# Patient Record
Sex: FEMALE | Race: WHITE
[De-identification: names, ages, dates, MRNs, and addresses within clinical notes are randomized per-mention and may not be internally consistent; named-entity substitution may affect disease eponyms.]

---

## 2019-01-25 ENCOUNTER — RECORD ABSTRACTING (OUTPATIENT)
Age: 74
End: 2019-01-25

## 2019-01-31 ENCOUNTER — APPOINTMENT (OUTPATIENT)
Dept: CARDIOLOGY | Facility: CLINIC | Age: 74
End: 2019-01-31
Payer: MEDICARE

## 2019-01-31 VITALS
DIASTOLIC BLOOD PRESSURE: 68 MMHG | WEIGHT: 148 LBS | OXYGEN SATURATION: 100 % | HEART RATE: 62 BPM | BODY MASS INDEX: 25.27 KG/M2 | SYSTOLIC BLOOD PRESSURE: 124 MMHG | HEIGHT: 64 IN

## 2019-01-31 DIAGNOSIS — Z87.39 PERSONAL HISTORY OF OTHER DISEASES OF THE MUSCULOSKELETAL SYSTEM AND CONNECTIVE TISSUE: ICD-10-CM

## 2019-01-31 DIAGNOSIS — Z78.9 OTHER SPECIFIED HEALTH STATUS: ICD-10-CM

## 2019-01-31 DIAGNOSIS — Z87.891 PERSONAL HISTORY OF NICOTINE DEPENDENCE: ICD-10-CM

## 2019-01-31 PROCEDURE — 99214 OFFICE O/P EST MOD 30 MIN: CPT

## 2019-01-31 RX ORDER — ASPIRIN 81 MG
81 TABLET, DELAYED RELEASE (ENTERIC COATED) ORAL DAILY
Refills: 0 | Status: ACTIVE | COMMUNITY

## 2019-01-31 RX ORDER — LEFLUNOMIDE 20 MG/1
20 TABLET, FILM COATED ORAL DAILY
Refills: 0 | Status: ACTIVE | COMMUNITY

## 2019-01-31 NOTE — ASSESSMENT
[FreeTextEntry1] : Reviewed on January 31, 2019\par Labs, EKG, chest x-ray, CT of the chest from [facility] were reviewed.

## 2019-01-31 NOTE — REVIEW OF SYSTEMS
[see HPI] : see HPI [Shortness Of Breath] : shortness of breath [Chest Pain] : chest pain [Palpitations] : palpitations [Negative] : Heme/Lymph

## 2019-01-31 NOTE — PHYSICAL EXAM
[General Appearance - Well Developed] : well developed [Normal Appearance] : normal appearance [Well Groomed] : well groomed [General Appearance - Well Nourished] : well nourished [No Deformities] : no deformities [General Appearance - In No Acute Distress] : no acute distress [No Oral Pallor] : no oral pallor [Normal Jugular Venous A Waves Present] : normal jugular venous A waves present [Normal Jugular Venous V Waves Present] : normal jugular venous V waves present [No Jugular Venous Langston A Waves] : no jugular venous langston A waves [Respiration, Rhythm And Depth] : normal respiratory rhythm and effort [Exaggerated Use Of Accessory Muscles For Inspiration] : no accessory muscle use [Auscultation Breath Sounds / Voice Sounds] : lungs were clear to auscultation bilaterally [Heart Rate And Rhythm] : heart rate and rhythm were normal [Heart Sounds] : normal S1 and S2 [Arterial Pulses Normal] : the arterial pulses were normal [Edema] : no peripheral edema present [Veins - Varicosity Changes] : no varicosital changes were noted in the lower extremities [Abdomen Soft] : soft [Abnormal Walk] : normal gait [Gait - Sufficient For Exercise Testing] : the gait was sufficient for exercise testing [Nail Clubbing] : no clubbing of the fingernails [Cyanosis, Localized] : no localized cyanosis [Petechial Hemorrhages (___cm)] : no petechial hemorrhages [Skin Color & Pigmentation] : normal skin color and pigmentation [] : no rash [No Venous Stasis] : no venous stasis [Skin Lesions] : no skin lesions [No Skin Ulcers] : no skin ulcer [No Xanthoma] : no  xanthoma was observed [Oriented To Time, Place, And Person] : oriented to person, place, and time [Affect] : the affect was normal [Mood] : the mood was normal [No Anxiety] : not feeling anxious [FreeTextEntry1] : Systolic ejection murmur 1-2/6, no bruit, no gallop, rub, or heave

## 2019-01-31 NOTE — DISCUSSION/SUMMARY
[FreeTextEntry1] : 72-year-old female with atypical chest pain in presence of multiple cardiovascular risk factors, which includes her postmenopausal age, hypertension, hyperlipidemia, former history of smoking.\par She is unable to do aggressive exercises.\par She also has exogenous hyperthyroidism at present in presence of underlying Graves' disease, which is being managed with medication changes.\par At present. I have recommended heard.\par Echocardiogram for ejection fraction wall motion pericardial space evaluation.\par Once thyroid is stable. I would recommend her to have stress and if needed pharmacological means considering low level of exercise tolerance and underlying Graves' disease myocardial perfusion scan to assess evaluate and rule out any significant ischemic heart disease as etiology for her symptoms.\par \par Essential hypertension. Well controlled. Continue present medications, which includes angiotensin receptor blocker, beta blocker, and calcium channel blocker, specifically with significantly noted symptoms.\par \par Hyperlipidemia. Continue statin therapy.\par \par Counseling regarding low saturated fat, salt and carbohydrate intake was reviewed. Active lifestyle and regular. Exercise along with weight management is advised.\par All the above were at length reviewed. Answered all the questions. Thank you very much for this kind referral. Please do not hesitate to give me a call for any question.\par Part of this transcription was done with voice recognition software and phonetically similar errors are common. I apologize for that. Please donot hesitate to call for any questions due to above.\par \par Followup S. plan\par

## 2019-01-31 NOTE — REASON FOR VISIT
[Follow-Up - From Hospitalization] : follow-up of a recent hospitalization for [Chest Pain] : chest pain [Hyperlipidemia] : hyperlipidemia [Hypertension] : hypertension [Palpitations] : palpitations [Spouse] : spouse

## 2019-01-31 NOTE — HISTORY OF PRESENT ILLNESS
[FreeTextEntry1] : 73-year-old comes in for followup consultation after a recent hospital admission.\par Her medical history mainly significant for\par Graves' disease with recent changes in medications.\par Essential hypertension without any history of congestive heart failure, renal insufficiency. She is a nonsmoker.\par Hyperlipidemia. On statin therapy.\par Rheumatoid arthritis with gray nodes syndrome.\par Gastroesophageal reflux disease.\par Recently admitted with chest pain. Evaluation when she was in the hospital included EKG, chest x-ray, CT of the chest, and labs. She was ruled out for myocardial infarction. She did not have any pulmonary embolism or significant pulmonary disease. Carvedilol was added to the medication regimen. Since then. She has done fairly well. Without any recurrence of symptoms. Her thyroid medication has been adjusted, and she was noted to be hyperthyroid.\par She is stable dyspnea on exertion without PND, orthopnea, or pedal edema.\par She has no significant worsening of palpitation and no syncopal episode.\par She has no claudication pain

## 2019-03-12 ENCOUNTER — APPOINTMENT (OUTPATIENT)
Dept: CARDIOLOGY | Facility: CLINIC | Age: 74
End: 2019-03-12

## 2019-04-03 ENCOUNTER — APPOINTMENT (OUTPATIENT)
Dept: CARDIOLOGY | Facility: CLINIC | Age: 74
End: 2019-04-03
Payer: MEDICARE

## 2019-04-03 VITALS
OXYGEN SATURATION: 98 % | HEIGHT: 64 IN | DIASTOLIC BLOOD PRESSURE: 60 MMHG | HEART RATE: 74 BPM | SYSTOLIC BLOOD PRESSURE: 144 MMHG | WEIGHT: 148 LBS | BODY MASS INDEX: 25.27 KG/M2

## 2019-04-03 PROCEDURE — 99214 OFFICE O/P EST MOD 30 MIN: CPT

## 2019-04-03 PROCEDURE — 93306 TTE W/DOPPLER COMPLETE: CPT

## 2019-04-03 NOTE — REVIEW OF SYSTEMS
[see HPI] : see HPI [Shortness Of Breath] : shortness of breath [Chest Pain] : chest pain [Lower Ext Edema] : lower extremity edema [Palpitations] : palpitations [Negative] : Heme/Lymph

## 2019-04-03 NOTE — DISCUSSION/SUMMARY
[FreeTextEntry1] : 74-year-old female with above medical history and active medical problems as noted.\par #1 atypical chest pain. No recent recurrence. Though multiple cardiovascular risk factors.\par At present. I have recommended her once she is euthyroid. We will do pharmacological myocardial perfusion scan as limited functional status.\par #2 persistent small left pleural effusion. Her mild borderline pulmonary hypertension on echocardiogram. Negative CTA except left pleural effusion. Cough and recent flulike symptoms. Recommended to see a pulmonologist.\par #3 Graves' disease. Recent adjustment of her thyroid medication. Over 18, TSH, and other titer profile to make sure she is euthyroid state.\par #4 mild aortic insufficiency and presence of preserved ejection fraction and dimensions.\par #5 essential hypertension. Mildly uncontrolled. Increase carvedilol to 6.25 mg twice daily. If worsening symptoms of raynaud's or dizziness, or other symptoms. She will decrease it back to 3.125 mg and contact us.\par Amlodipine could be due to reason for her bilateral mild ankle edema. At present we have not changed. It considering it is significantly helping her with raynaud's.\par Continue with losartan.\par Low salt diet.\par #6 hyperlipidemia. Continue statin therapy.\par \par Counseling regarding low saturated fat, salt and carbohydrate intake was reviewed. Active lifestyle and regular. Exercise along with weight management is advised.\par All the above were at length reviewed. Answered all the questions. Thank you very much for this kind referral. Please do not hesitate to give me a call for any question.\par Part of this transcription was done with voice recognition software and phonetically similar errors are common. I apologize for that. Please donot hesitate to call for any questions due to above.\par \par Followup as planned

## 2019-04-03 NOTE — ASSESSMENT
[FreeTextEntry1] : Reviewed on January 31, 2019\par Labs, EKG, chest x-ray, CT of the chest from [facility] were reviewed.\par \par Reviewed on April 3, 2019\par Echocardiogram. Improved her test is 19. Reviewed by me. Ejection fraction 65% mild aortic insufficiency. Mild mitral regurgitation. No pericardial effusion. Pulmonary pressure around 40 mm mercury suggesting mild pulmonary hypertension.\par Chest x-ray are obtained by her recently for her pulmonary symptoms had shown persistent small left pleural effusion, otherwise no significant cardiovascular abnormality.\par CT of the chest in January 2019 at [facility] had shown also small left pleural effusion. No pericardial effusion.

## 2019-04-03 NOTE — PHYSICAL EXAM
[General Appearance - Well Developed] : well developed [Normal Appearance] : normal appearance [Well Groomed] : well groomed [General Appearance - Well Nourished] : well nourished [No Deformities] : no deformities [General Appearance - In No Acute Distress] : no acute distress [No Oral Pallor] : no oral pallor [Normal Jugular Venous A Waves Present] : normal jugular venous A waves present [Normal Jugular Venous V Waves Present] : normal jugular venous V waves present [No Jugular Venous Langston A Waves] : no jugular venous langston A waves [Respiration, Rhythm And Depth] : normal respiratory rhythm and effort [Exaggerated Use Of Accessory Muscles For Inspiration] : no accessory muscle use [Auscultation Breath Sounds / Voice Sounds] : lungs were clear to auscultation bilaterally [Heart Rate And Rhythm] : heart rate and rhythm were normal [Heart Sounds] : normal S1 and S2 [Arterial Pulses Normal] : the arterial pulses were normal [Veins - Varicosity Changes] : no varicosital changes were noted in the lower extremities [FreeTextEntry1] : Systolic ejection murmur 1-2/6, no bruit, no gallop, rub, or heave, 1+ bilateral ankle edema [Abdomen Soft] : soft [Abnormal Walk] : normal gait [Gait - Sufficient For Exercise Testing] : the gait was sufficient for exercise testing [Nail Clubbing] : no clubbing of the fingernails [Cyanosis, Localized] : no localized cyanosis [Petechial Hemorrhages (___cm)] : no petechial hemorrhages [Skin Color & Pigmentation] : normal skin color and pigmentation [] : no rash [No Venous Stasis] : no venous stasis [Skin Lesions] : no skin lesions [No Skin Ulcers] : no skin ulcer [No Xanthoma] : no  xanthoma was observed [Oriented To Time, Place, And Person] : oriented to person, place, and time [Affect] : the affect was normal [Mood] : the mood was normal [No Anxiety] : not feeling anxious

## 2019-04-03 NOTE — HISTORY OF PRESENT ILLNESS
[FreeTextEntry1] : \par Her medical history mainly significant for\par Graves' disease with recent changes in medications.\par Essential hypertension without any history of congestive heart failure, renal insufficiency. She is a nonsmoker.\par Hyperlipidemia. On statin therapy.\par Rheumatoid arthritis with gray nodes syndrome.\par Gastroesophageal reflux disease.\par Recently admitted with chest pain. Evaluation when she was in the hospital included EKG, chest x-ray, CT of the chest, and labs. She was ruled out for myocardial infarction. She did not have any pulmonary embolism or significant pulmonary disease. Carvedilol was added to the medication regimen. Since then. She has done fairly well. Without any recurrence of symptoms. Her thyroid medication has been adjusted, and she was noted to be hyperthyroid.\par

## 2019-04-03 NOTE — REASON FOR VISIT
[Follow-Up - Clinic] : a clinic follow-up of [Chest Pain] : chest pain [Hyperlipidemia] : hyperlipidemia [Hypertension] : hypertension [Palpitations] : palpitations [FreeTextEntry1] : 74-year-old comes in for followup consultation two-view echocardiogram. Also, she had a chest x-ray because of her recent flulike syndrome.\par She has no significant chest pain at present. No PND, orthopnea.\par She has no significant palpitation, dizziness, syncopal episode.\par She does have dry cough, which is improving without PND, orthopnea.\par She has mild chronic edema.\par She denies any hemoptysis.\par She has not had a thyroid function checked [Spouse] : spouse

## 2019-04-24 ENCOUNTER — APPOINTMENT (OUTPATIENT)
Dept: CARDIOLOGY | Facility: CLINIC | Age: 74
End: 2019-04-24
Payer: MEDICARE

## 2019-04-24 ENCOUNTER — NON-APPOINTMENT (OUTPATIENT)
Age: 74
End: 2019-04-24

## 2019-04-24 VITALS
HEIGHT: 64 IN | DIASTOLIC BLOOD PRESSURE: 76 MMHG | HEART RATE: 65 BPM | SYSTOLIC BLOOD PRESSURE: 132 MMHG | BODY MASS INDEX: 25.61 KG/M2 | WEIGHT: 150 LBS

## 2019-04-24 PROCEDURE — 93000 ELECTROCARDIOGRAM COMPLETE: CPT

## 2019-04-24 PROCEDURE — 99214 OFFICE O/P EST MOD 30 MIN: CPT

## 2019-04-24 NOTE — ASSESSMENT
[FreeTextEntry1] : Reviewed today:  EKG, NSR. \par \par Reviewed on January 31, 2019\par Labs, EKG, chest x-ray, CT of the chest from [facility] were reviewed.\par \par Reviewed on April 3, 2019\par Echocardiogram. Improved her test is 19. Reviewed by me. Ejection fraction 65% mild aortic insufficiency. Mild mitral regurgitation. No pericardial effusion. Pulmonary pressure around 40 mm mercury suggesting mild pulmonary hypertension.\par Chest x-ray are obtained by her recently for her pulmonary symptoms had shown persistent small left pleural effusion, otherwise no significant cardiovascular abnormality.\par CT of the chest in January 2019 at [facility] had shown also small left pleural effusion. No pericardial effusion.

## 2019-04-24 NOTE — REVIEW OF SYSTEMS
[Shortness Of Breath] : shortness of breath [see HPI] : see HPI [Chest Pain] : chest pain [Lower Ext Edema] : lower extremity edema [Palpitations] : palpitations [Negative] : Heme/Lymph

## 2019-04-24 NOTE — DISCUSSION/SUMMARY
[FreeTextEntry1] : 74-year-old female with above medical history and active medical problems as noted.\par #1 atypical chest pain. No recent recurrence. Though multiple cardiovascular risk factors.\par At present. I have recommended her once she is euthyroid. We will do pharmacological myocardial perfusion scan as limited functional status.\par #2 persistent small left pleural effusion. Her mild borderline pulmonary hypertension on echocardiogram. Negative CTA except left pleural effusion. Cough and recent flulike symptoms. Recommended to see a pulmonologist.\par #3 Graves' disease. Recent adjustment of her thyroid medication. Over 18, TSH, and other titer profile to make sure she is euthyroid state.\par #4 mild aortic insufficiency and presence of preserved ejection fraction and dimensions.\par #5 essential hypertension. Mildly uncontrolled.  Continue carvedilol to 6.25 mg twice daily.\par Amlodipine could be due to reason for her bilateral mild ankle edema. At present we have not changed. It considering it is significantly helping her with raynaud's.  Recent dietary indiscretion.  Start furosemide 20mg QD until edema resolved.  Weight based dosing after that.  Utilize with 2-3lb increase in 24 hours or return of edema. Labs ordered.  My require K+ supplementation based on lab work.  Increase K rich foods. \par Continue with losartan.\par Low salt diet.\par #6 hyperlipidemia. Continue statin therapy.\par \par Counseling regarding low saturated fat, salt and carbohydrate intake was reviewed. Active lifestyle and regular. Exercise along with weight management is advised.\par All the above were at length reviewed. Answered all the questions. Thank you very much for this kind referral. Please do not hesitate to give me a call for any question.\par Part of this transcription was done with voice recognition software and phonetically similar errors are common. I apologize for that. Please donot hesitate to call for any questions due to above.\par \par Followup as planned

## 2019-04-24 NOTE — REASON FOR VISIT
[Follow-Up - Clinic] : a clinic follow-up of [Hyperlipidemia] : hyperlipidemia [Chest Pain] : chest pain [Palpitations] : palpitations [Hypertension] : hypertension [FreeTextEntry2] : b/l edema [FreeTextEntry1] : 74-year-old comes in for followup for worsening b/l edema.  Echo 4/3/19 didn't show any significant cardiomyopathy or valvulopathy. \par Notes edema has worsened in conjunction with Na+ intake.  Improved with elevation and low Na+ diet. \par She has no significant chest pain at present. No PND, orthopnea.\par She has no significant palpitation, dizziness, syncopal episode.\par She does have dry cough, which is improving without PND, orthopnea.\par She has mild chronic edema.\par She denies any hemoptysis.\par She has not had a thyroid function checked

## 2019-04-24 NOTE — HISTORY OF PRESENT ILLNESS
[FreeTextEntry1] : Her medical history mainly significant for\par Graves' disease with recent changes in medications.\par Essential hypertension without any history of congestive heart failure, renal insufficiency. She is a nonsmoker.\par Hyperlipidemia. On statin therapy.\par Rheumatoid arthritis with gray nodes syndrome.\par Gastroesophageal reflux disease.\par Recently admitted with chest pain. Evaluation when she was in the hospital included EKG, chest x-ray, CT of the chest, and labs. She was ruled out for myocardial infarction. She did not have any pulmonary embolism or significant pulmonary disease. Carvedilol was added to the medication regimen. Since then. She has done fairly well. Without any recurrence of symptoms. Her thyroid medication has been adjusted, and she was noted to be hyperthyroid.\par

## 2019-04-24 NOTE — PHYSICAL EXAM
[General Appearance - Well Developed] : well developed [Normal Appearance] : normal appearance [Well Groomed] : well groomed [General Appearance - Well Nourished] : well nourished [No Deformities] : no deformities [General Appearance - In No Acute Distress] : no acute distress [No Oral Pallor] : no oral pallor [Normal Jugular Venous A Waves Present] : normal jugular venous A waves present [Normal Jugular Venous V Waves Present] : normal jugular venous V waves present [No Jugular Venous Langston A Waves] : no jugular venous langston A waves [Respiration, Rhythm And Depth] : normal respiratory rhythm and effort [Exaggerated Use Of Accessory Muscles For Inspiration] : no accessory muscle use [Auscultation Breath Sounds / Voice Sounds] : lungs were clear to auscultation bilaterally [Heart Rate And Rhythm] : heart rate and rhythm were normal [Heart Sounds] : normal S1 and S2 [Arterial Pulses Normal] : the arterial pulses were normal [Veins - Varicosity Changes] : no varicosital changes were noted in the lower extremities [Abdomen Soft] : soft [Abnormal Walk] : normal gait [Nail Clubbing] : no clubbing of the fingernails [Gait - Sufficient For Exercise Testing] : the gait was sufficient for exercise testing [Petechial Hemorrhages (___cm)] : no petechial hemorrhages [Cyanosis, Localized] : no localized cyanosis [Skin Color & Pigmentation] : normal skin color and pigmentation [] : no rash [No Venous Stasis] : no venous stasis [Skin Lesions] : no skin lesions [No Skin Ulcers] : no skin ulcer [No Xanthoma] : no  xanthoma was observed [Oriented To Time, Place, And Person] : oriented to person, place, and time [Affect] : the affect was normal [Mood] : the mood was normal [No Anxiety] : not feeling anxious [FreeTextEntry1] : Systolic ejection murmur 1-2/6, no bruit, no gallop, rub, or heave, 1+ bilateral ankle edema

## 2019-08-05 ENCOUNTER — APPOINTMENT (OUTPATIENT)
Dept: CARDIOLOGY | Facility: CLINIC | Age: 74
End: 2019-08-05
Payer: MEDICARE

## 2019-08-05 VITALS
DIASTOLIC BLOOD PRESSURE: 70 MMHG | WEIGHT: 154 LBS | BODY MASS INDEX: 26.29 KG/M2 | OXYGEN SATURATION: 97 % | HEIGHT: 64 IN | HEART RATE: 66 BPM | SYSTOLIC BLOOD PRESSURE: 172 MMHG

## 2019-08-05 VITALS — DIASTOLIC BLOOD PRESSURE: 72 MMHG | SYSTOLIC BLOOD PRESSURE: 156 MMHG

## 2019-08-05 PROCEDURE — 99214 OFFICE O/P EST MOD 30 MIN: CPT

## 2019-08-05 RX ORDER — LEVOTHYROXINE SODIUM 88 UG/1
88 TABLET ORAL DAILY
Refills: 0 | Status: DISCONTINUED | COMMUNITY
End: 2019-08-05

## 2019-08-05 RX ORDER — METHOTREXATE 2.5 MG/1
2.5 TABLET ORAL
Refills: 0 | Status: DISCONTINUED | COMMUNITY
End: 2019-08-05

## 2019-08-05 NOTE — PHYSICAL EXAM
[General Appearance - Well Developed] : well developed [Normal Appearance] : normal appearance [Well Groomed] : well groomed [General Appearance - Well Nourished] : well nourished [No Deformities] : no deformities [General Appearance - In No Acute Distress] : no acute distress [No Oral Pallor] : no oral pallor [Normal Jugular Venous V Waves Present] : normal jugular venous V waves present [Normal Jugular Venous A Waves Present] : normal jugular venous A waves present [No Jugular Venous Langston A Waves] : no jugular venous langston A waves [Exaggerated Use Of Accessory Muscles For Inspiration] : no accessory muscle use [Respiration, Rhythm And Depth] : normal respiratory rhythm and effort [Auscultation Breath Sounds / Voice Sounds] : lungs were clear to auscultation bilaterally [Heart Sounds] : normal S1 and S2 [Heart Rate And Rhythm] : heart rate and rhythm were normal [Veins - Varicosity Changes] : no varicosital changes were noted in the lower extremities [FreeTextEntry1] : Systolic ejection murmur 1-2/6, no bruit, no gallop, rub, or heave, 1+ bilateral ankle edema [Arterial Pulses Normal] : the arterial pulses were normal [Abdomen Soft] : soft [Abnormal Walk] : normal gait [Gait - Sufficient For Exercise Testing] : the gait was sufficient for exercise testing [Nail Clubbing] : no clubbing of the fingernails [Petechial Hemorrhages (___cm)] : no petechial hemorrhages [Cyanosis, Localized] : no localized cyanosis [Skin Color & Pigmentation] : normal skin color and pigmentation [] : no rash [No Venous Stasis] : no venous stasis [No Skin Ulcers] : no skin ulcer [No Xanthoma] : no  xanthoma was observed [Skin Lesions] : no skin lesions [Oriented To Time, Place, And Person] : oriented to person, place, and time [Affect] : the affect was normal [No Anxiety] : not feeling anxious [Mood] : the mood was normal

## 2019-08-05 NOTE — DISCUSSION/SUMMARY
[FreeTextEntry1] : 74-year-old female with above medical history and active medical problems as noted.\par #1 Elevated blood pressure. Unable to tolerate furosemide in presence of lower eczema. D. edema. No clinical signs of pleural effusion at present.\par We will add chlorthalidone 25 mg of the present regimen.\par She will have basic metabolic panel checked again in 2 weeks.\par She will follow her blood pressure with primary care physician and hematologist.\par Goal less than 120/80.\par If tolerated, and better control of blood pressure we can try to combine it with angiotensin receptor blocker.\par Continued low salt diet.\par #2 Lower extremity edema. Continue compression stockings. Management as discussed above.\par #3 Graves' disease. Recent adjustment of her thyroid medication. \par #4 mild aortic insufficiency and presence of preserved ejection fraction and dimensions.\par #5 Mild pulmonary hypertension. Followup echocardiogram in future if the\par #6 hyperlipidemia. Continue statin therapy.\par \par Counseling regarding low saturated fat, salt and carbohydrate intake was reviewed. Active lifestyle and regular. Exercise along with weight management is advised.\par All the above were at length reviewed. Answered all the questions. Thank you very much for this kind referral. Please do not hesitate to give me a call for any question.\par Part of this transcription was done with voice recognition software and phonetically similar errors are common. I apologize for that. Please donot hesitate to call for any questions due to above.\par \par Followup as planned

## 2019-08-05 NOTE — REASON FOR VISIT
[Follow-Up - Clinic] : a clinic follow-up of [Chest Pain] : chest pain [Hyperlipidemia] : hyperlipidemia [Hypertension] : hypertension [Palpitations] : palpitations [FreeTextEntry2] : b/l edema [FreeTextEntry1] : 74-year-old female comes in for followup consultation for management of her bilateral lower extremity edema in the absence of any significant cardiomyopathy. She does have mild pulmonary hypertension without significant valvular overload. She tried furosemide, but does not want to take it as she has to go to the bathroom significantly with decreased quality of life. She is using compression stockings with minimal benefit.\par She is being evaluated by hematologist in getting vitamin B12 injections for her anemia.\par She is being evaluated for Graves' disease in getting Synthroid at present.\par She has no increased salt intake. No increasing alcohol intake. \par She has no significant chest pain at present. No PND, orthopnea.\par She has no significant palpitation, dizziness, syncopal episode.\par She does have dry cough, which is improving without PND, orthopnea.\par She denies any hemoptysis.\par

## 2019-10-16 ENCOUNTER — APPOINTMENT (OUTPATIENT)
Dept: CARDIOLOGY | Facility: CLINIC | Age: 74
End: 2019-10-16

## 2019-10-16 RX ORDER — LOSARTAN POTASSIUM 100 MG/1
100 TABLET, FILM COATED ORAL DAILY
Refills: 0 | Status: DISCONTINUED | COMMUNITY
End: 2019-10-16

## 2019-10-16 RX ORDER — CHLORTHALIDONE 25 MG/1
25 TABLET ORAL
Qty: 90 | Refills: 3 | Status: DISCONTINUED | COMMUNITY
Start: 2019-08-05 | End: 2019-10-16

## 2020-02-03 ENCOUNTER — RX RENEWAL (OUTPATIENT)
Age: 75
End: 2020-02-03

## 2020-03-18 RX ORDER — LOSARTAN POTASSIUM AND HYDROCHLOROTHIAZIDE 12.5; 1 MG/1; MG/1
100-12.5 TABLET ORAL
Qty: 90 | Refills: 1 | Status: DISCONTINUED | COMMUNITY
Start: 2019-10-16 | End: 2020-03-18

## 2020-08-10 ENCOUNTER — APPOINTMENT (OUTPATIENT)
Dept: CARDIOLOGY | Facility: CLINIC | Age: 75
End: 2020-08-10
Payer: MEDICARE

## 2020-08-10 ENCOUNTER — NON-APPOINTMENT (OUTPATIENT)
Age: 75
End: 2020-08-10

## 2020-08-10 VITALS
TEMPERATURE: 97.7 F | DIASTOLIC BLOOD PRESSURE: 62 MMHG | HEART RATE: 60 BPM | BODY MASS INDEX: 28.17 KG/M2 | WEIGHT: 165 LBS | OXYGEN SATURATION: 93 % | HEIGHT: 64 IN | SYSTOLIC BLOOD PRESSURE: 128 MMHG

## 2020-08-10 PROCEDURE — 99214 OFFICE O/P EST MOD 30 MIN: CPT

## 2020-08-10 PROCEDURE — 93000 ELECTROCARDIOGRAM COMPLETE: CPT

## 2020-08-10 RX ORDER — FOLIC ACID 1 MG/1
1 TABLET ORAL DAILY
Refills: 0 | Status: DISCONTINUED | COMMUNITY
End: 2020-08-10

## 2020-08-10 RX ORDER — MUPIROCIN 20 MG/G
2 OINTMENT TOPICAL
Qty: 22 | Refills: 0 | Status: DISCONTINUED | COMMUNITY
Start: 2019-03-12 | End: 2020-08-10

## 2020-08-10 RX ORDER — LOSARTAN POTASSIUM AND HYDROCHLOROTHIAZIDE 25; 100 MG/1; MG/1
100-25 TABLET ORAL DAILY
Qty: 90 | Refills: 0 | Status: DISCONTINUED | COMMUNITY
Start: 1900-01-01 | End: 2020-08-10

## 2020-08-10 NOTE — DISCUSSION/SUMMARY
[FreeTextEntry1] : 75-year-old female with above medical history and active medical problems as noted.\par #1 Elevated blood pressure.  Bilateral ankle edema.  Improved blood pressure control with postural symptoms.\par We will decrease amlodipine to 5 mg.  Losartan/hydrochlorthiazide will be changed to losartan 100 mg.  She will take her furosemide 20 mg daily.  She will have labs as ordered.\par Continued low salt diet.\par She will follow her blood pressure.  Goal less than 130/80.  She will contact me if blood pressure increases or edema does not improve.\par #2 Lower extremity edema. Continue compression stockings. Management as discussed above.\par #3 Graves' disease. Recent adjustment of her thyroid medication. \par #4 mild aortic insufficiency, borderline pulmonary hypertension.  Follow-up echocardiogram ordered.\par #5 hyperlipidemia. Continue statin therapy.  Tolerating it well.\par \par Counseling regarding low saturated fat, salt and carbohydrate intake was reviewed. Active lifestyle and regular. Exercise along with weight management is advised.\par All the above were at length reviewed. Answered all the questions. Thank you very much for this kind referral. Please do not hesitate to give me a call for any question.\par Part of this transcription was done with voice recognition software and phonetically similar errors are common. I apologize for that. Please donot hesitate to call for any questions due to above.\par \par Followup as planned

## 2020-08-10 NOTE — PHYSICAL EXAM
[Normal Appearance] : normal appearance [General Appearance - Well Developed] : well developed [Well Groomed] : well groomed [General Appearance - Well Nourished] : well nourished [General Appearance - In No Acute Distress] : no acute distress [No Deformities] : no deformities [No Oral Pallor] : no oral pallor [Normal Jugular Venous A Waves Present] : normal jugular venous A waves present [Normal Jugular Venous V Waves Present] : normal jugular venous V waves present [No Jugular Venous Langston A Waves] : no jugular venous langston A waves [Respiration, Rhythm And Depth] : normal respiratory rhythm and effort [Auscultation Breath Sounds / Voice Sounds] : lungs were clear to auscultation bilaterally [Exaggerated Use Of Accessory Muscles For Inspiration] : no accessory muscle use [Heart Sounds] : normal S1 and S2 [Heart Rate And Rhythm] : heart rate and rhythm were normal [Arterial Pulses Normal] : the arterial pulses were normal [FreeTextEntry1] : Systolic ejection murmur 1-2/6, no bruit, no gallop, rub, or heave, 1+ bilateral ankle edema [Veins - Varicosity Changes] : no varicosital changes were noted in the lower extremities [Abnormal Walk] : normal gait [Abdomen Soft] : soft [Gait - Sufficient For Exercise Testing] : the gait was sufficient for exercise testing [Nail Clubbing] : no clubbing of the fingernails [Cyanosis, Localized] : no localized cyanosis [Petechial Hemorrhages (___cm)] : no petechial hemorrhages [Skin Color & Pigmentation] : normal skin color and pigmentation [] : no rash [No Venous Stasis] : no venous stasis [No Skin Ulcers] : no skin ulcer [Skin Lesions] : no skin lesions [Oriented To Time, Place, And Person] : oriented to person, place, and time [No Xanthoma] : no  xanthoma was observed [Affect] : the affect was normal [Mood] : the mood was normal [No Anxiety] : not feeling anxious

## 2020-08-10 NOTE — REASON FOR VISIT
[Follow-Up - Clinic] : a clinic follow-up of [Chest Pain] : chest pain [Hyperlipidemia] : hyperlipidemia [Palpitations] : palpitations [Medication Management] : Medication management [Hypertension] : hypertension [FreeTextEntry1] : 75-year-old female comes in for followup consultation for management of her bilateral lower extremity edema in the absence of any significant cardiomyopathy. She does have mild pulmonary hypertension without significant valvular overload.  It looks like with changes in medication and intermittent use of furosemide her blood pressure is remaining low on multiple occasions at different physician's office with associated postural dizziness.  There is no associated palpitations.  There is no arm pain or jaw pain.  Her ankle edema has remained stable.  She is taking furosemide intermittently.\par She is being evaluated by hematologist in getting vitamin B12 injections for her anemia.\par She is being evaluated for Graves' disease in getting Synthroid at present.\par She has no increased salt intake. No increasing alcohol intake. \par She has no significant chest pain at present. No PND, orthopnea.\par She has no syncopal event.\par There is no PND orthopnea.\par She denies any hemoptysis.\par

## 2020-08-10 NOTE — ASSESSMENT
[FreeTextEntry1] : Reviewed today:  EKG, NSR. \par \par Reviewed on January 31, 2019\par Labs, EKG, chest x-ray, CT of the chest from [facility] were reviewed.\par \par Reviewed on April 3, 2019\par Echocardiogram. . Reviewed by me. Ejection fraction 65% mild aortic insufficiency. Mild mitral regurgitation. No pericardial effusion. Pulmonary pressure around 40 mm mercury suggesting mild pulmonary hypertension.\par Chest x-ray are obtained by her recently for her pulmonary symptoms had shown persistent small left pleural effusion, otherwise no significant cardiovascular abnormality.\par CT of the chest in January 2019 at [facility] had shown also small left pleural effusion. No pericardial effusion.\par \par Reviewed on August 10, 2020.\par EKG August 10, 2020.  Sinus bradycardia.  Low voltage.  Anterior infarct versus poor R wave progression.\par Most recent labs for CBC done on 8/4/2020 showed hemoglobin 12.  Platelet count 230\par Rest of the other labs as ordered by me are pending to be done on Wednesday\par

## 2020-08-10 NOTE — REVIEW OF SYSTEMS
[see HPI] : see HPI [Shortness Of Breath] : shortness of breath [Chest Pain] : no chest pain [Lower Ext Edema] : lower extremity edema [Palpitations] : palpitations [Negative] : Heme/Lymph

## 2020-08-18 ENCOUNTER — TRANSCRIPTION ENCOUNTER (OUTPATIENT)
Age: 75
End: 2020-08-18

## 2020-11-06 ENCOUNTER — RX RENEWAL (OUTPATIENT)
Age: 75
End: 2020-11-06

## 2020-11-12 ENCOUNTER — APPOINTMENT (OUTPATIENT)
Dept: CARDIOLOGY | Facility: CLINIC | Age: 75
End: 2020-11-12
Payer: MEDICARE

## 2020-11-12 ENCOUNTER — TRANSCRIPTION ENCOUNTER (OUTPATIENT)
Age: 75
End: 2020-11-12

## 2020-11-12 VITALS
DIASTOLIC BLOOD PRESSURE: 64 MMHG | SYSTOLIC BLOOD PRESSURE: 130 MMHG | OXYGEN SATURATION: 93 % | BODY MASS INDEX: 28.68 KG/M2 | HEIGHT: 64 IN | WEIGHT: 168 LBS | HEART RATE: 72 BPM

## 2020-11-12 DIAGNOSIS — R07.89 OTHER CHEST PAIN: ICD-10-CM

## 2020-11-12 PROCEDURE — 99214 OFFICE O/P EST MOD 30 MIN: CPT

## 2020-11-12 PROCEDURE — 93306 TTE W/DOPPLER COMPLETE: CPT

## 2020-11-12 NOTE — DISCUSSION/SUMMARY
[FreeTextEntry1] : 75-year-old female with above medical history and active medical problems as noted.\par #1  Blood pressure stable.  Continue present regimen of medications.\par Continued low salt diet.\par She will follow her blood pressure.  Goal less than 130/80.  She will contact me if blood pressure increases or edema does not improve.\par #2 Lower extremity edema. Continue compression stockings.  \par #3 Graves' disease. Recent adjustment of her thyroid medication. \par #4 mild aortic insufficiency, borderline pulmonary hypertension.  Follow-up echocardiogram ordered.\par #5 hyperlipidemia. Continue statin therapy.  Tolerating it well.\par \par Counseling regarding low saturated fat, salt and carbohydrate intake was reviewed. Active lifestyle and regular. Exercise along with weight management is advised.\par All the above were at length reviewed. Answered all the questions. Thank you very much for this kind referral. Please do not hesitate to give me a call for any question.\par Part of this transcription was done with voice recognition software and phonetically similar errors are common. I apologize for that. Please donot hesitate to call for any questions due to above.\par \par Followup as planned

## 2020-11-12 NOTE — HISTORY OF PRESENT ILLNESS
[FreeTextEntry1] : Her medical history mainly significant for\par Graves' disease with recent changes in medications.\par Essential hypertension without any history of congestive heart failure, renal insufficiency. She is a nonsmoker.\par Hyperlipidemia. On statin therapy.\par Rheumatoid arthritis with gray nodes syndrome.\par Gastroesophageal reflux disease.\par \par

## 2020-11-12 NOTE — ASSESSMENT
[FreeTextEntry1] : Reviewed today:  EKG, NSR. \par \par Reviewed on January 31, 2019\par Labs, EKG, chest x-ray, CT of the chest from [facility] were reviewed.\par \par Reviewed on April 3, 2019\par Echocardiogram. . Reviewed by me. Ejection fraction 65% mild aortic insufficiency. Mild mitral regurgitation. No pericardial effusion. Pulmonary pressure around 40 mm mercury suggesting mild pulmonary hypertension.\par Chest x-ray are obtained by her recently for her pulmonary symptoms had shown persistent small left pleural effusion, otherwise no significant cardiovascular abnormality.\par CT of the chest in January 2019 at [facility] had shown also small left pleural effusion. No pericardial effusion.\par \par Reviewed on August 10, 2020.\par EKG August 10, 2020.  Sinus bradycardia.  Low voltage.  Anterior infarct versus poor R wave progression.\par Most recent labs for CBC done on 8/4/2020 showed hemoglobin 12.  Platelet count 230\par Rest of the other labs as ordered by me are pending to be done on Wednesday\par \par Reviewed on November 12, 2020\par Echocardiogram which was done on November 12, 2020 EF 65% mild mitral and aortic regurgitation mild tricuspid regurgitation PASP 44 mmHg\par

## 2020-11-12 NOTE — REASON FOR VISIT
[Follow-Up - Clinic] : a clinic follow-up of [Chest Pain] : chest pain [Hyperlipidemia] : hyperlipidemia [Hypertension] : hypertension [Medication Management] : Medication management [Palpitations] : palpitations [FreeTextEntry1] : 75-year-old female comes in for followup consultation for management of her bilateral lower extremity edema in the absence of any significant cardiomyopathy. She does have mild pulmonary hypertension without significant valvular overload. \par Her edema has improved.  She is taking low-dose of furosemide every other day.  Her Norvasc was decreased to 5 mg.\par Because of her significant back pain for which she is planning to have surgery her activity level is reduced.  But she has no chest pain.  There is no unusual shortness of breath.  She has no palpitations.\par She has no syncopal event.\par There is no PND orthopnea.\par \par

## 2020-11-12 NOTE — REVIEW OF SYSTEMS
[see HPI] : see HPI [Shortness Of Breath] : shortness of breath [Lower Ext Edema] : lower extremity edema [Palpitations] : palpitations [Negative] : Heme/Lymph [Chest Pain] : no chest pain

## 2020-11-12 NOTE — PHYSICAL EXAM
[General Appearance - Well Developed] : well developed [Normal Appearance] : normal appearance [Well Groomed] : well groomed [General Appearance - Well Nourished] : well nourished [No Deformities] : no deformities [General Appearance - In No Acute Distress] : no acute distress [Respiration, Rhythm And Depth] : normal respiratory rhythm and effort [Exaggerated Use Of Accessory Muscles For Inspiration] : no accessory muscle use [Auscultation Breath Sounds / Voice Sounds] : lungs were clear to auscultation bilaterally [Heart Rate And Rhythm] : heart rate and rhythm were normal [Heart Sounds] : normal S1 and S2 [Arterial Pulses Normal] : the arterial pulses were normal [Veins - Varicosity Changes] : no varicosital changes were noted in the lower extremities [Abdomen Soft] : soft [Abnormal Walk] : normal gait [Gait - Sufficient For Exercise Testing] : the gait was sufficient for exercise testing [Nail Clubbing] : no clubbing of the fingernails [Cyanosis, Localized] : no localized cyanosis [Petechial Hemorrhages (___cm)] : no petechial hemorrhages [Skin Color & Pigmentation] : normal skin color and pigmentation [] : no rash [No Venous Stasis] : no venous stasis [Skin Lesions] : no skin lesions [No Skin Ulcers] : no skin ulcer [No Xanthoma] : no  xanthoma was observed [Oriented To Time, Place, And Person] : oriented to person, place, and time [Affect] : the affect was normal [Mood] : the mood was normal [No Anxiety] : not feeling anxious [FreeTextEntry1] : Systolic ejection murmur 1-2/6, no bruit, no gallop, rub, or heave, 1+ bilateral ankle edema

## 2020-12-02 ENCOUNTER — TRANSCRIPTION ENCOUNTER (OUTPATIENT)
Age: 75
End: 2020-12-02

## 2020-12-09 ENCOUNTER — APPOINTMENT (OUTPATIENT)
Dept: CARDIOLOGY | Facility: CLINIC | Age: 75
End: 2020-12-09
Payer: MEDICARE

## 2020-12-09 ENCOUNTER — NON-APPOINTMENT (OUTPATIENT)
Age: 75
End: 2020-12-09

## 2020-12-09 VITALS
WEIGHT: 166 LBS | HEIGHT: 64 IN | HEART RATE: 62 BPM | BODY MASS INDEX: 28.34 KG/M2 | SYSTOLIC BLOOD PRESSURE: 130 MMHG | OXYGEN SATURATION: 95 % | DIASTOLIC BLOOD PRESSURE: 82 MMHG

## 2020-12-09 PROCEDURE — 93000 ELECTROCARDIOGRAM COMPLETE: CPT

## 2020-12-09 PROCEDURE — 99214 OFFICE O/P EST MOD 30 MIN: CPT

## 2020-12-09 RX ORDER — NEBULIZER AND COMPRESSOR
EACH MISCELLANEOUS
Qty: 1 | Refills: 0 | Status: DISCONTINUED | COMMUNITY
Start: 2019-03-07 | End: 2020-12-09

## 2020-12-09 NOTE — PHYSICAL EXAM
[General Appearance - Well Developed] : well developed [Normal Appearance] : normal appearance [Well Groomed] : well groomed [General Appearance - Well Nourished] : well nourished [No Deformities] : no deformities [General Appearance - In No Acute Distress] : no acute distress [Respiration, Rhythm And Depth] : normal respiratory rhythm and effort [Exaggerated Use Of Accessory Muscles For Inspiration] : no accessory muscle use [Auscultation Breath Sounds / Voice Sounds] : lungs were clear to auscultation bilaterally [Heart Rate And Rhythm] : heart rate and rhythm were normal [Heart Sounds] : normal S1 and S2 [Arterial Pulses Normal] : the arterial pulses were normal [Veins - Varicosity Changes] : no varicosital changes were noted in the lower extremities [Abdomen Soft] : soft [Abnormal Walk] : normal gait [Gait - Sufficient For Exercise Testing] : the gait was sufficient for exercise testing [Nail Clubbing] : no clubbing of the fingernails [Cyanosis, Localized] : no localized cyanosis [Petechial Hemorrhages (___cm)] : no petechial hemorrhages [Skin Color & Pigmentation] : normal skin color and pigmentation [] : no rash [No Venous Stasis] : no venous stasis [Skin Lesions] : no skin lesions [No Skin Ulcers] : no skin ulcer [No Xanthoma] : no  xanthoma was observed [Oriented To Time, Place, And Person] : oriented to person, place, and time [Affect] : the affect was normal [Mood] : the mood was normal [No Anxiety] : not feeling anxious [FreeTextEntry1] : 1+ bilateral ankle edema, 1/6 GIGI

## 2020-12-09 NOTE — REVIEW OF SYSTEMS
[Lower Ext Edema] : lower extremity edema [Negative] : Heme/Lymph [Shortness Of Breath] : no shortness of breath [Dyspnea on exertion] : not dyspnea during exertion [Chest  Pressure] : no chest pressure [Chest Pain] : no chest pain [Palpitations] : no palpitations [see HPI] : see HPI [Joint Pain] : joint pain

## 2020-12-09 NOTE — DISCUSSION/SUMMARY
[FreeTextEntry1] : LAURA BENZ is a 75 year old F who presents today Dec 09, 2020 for preop cardiac clearance for back surgery. \par \par #1 Preoperative assessment and recommendations - \par Her EKG shows no acute abnormality and she offers no exertional symptoms. \par At present, there are no active cardiac conditions. \par No recent unstable coronary syndromes, decompensated heart failure, severe valvular heart disease or significant dysrhythmias.  \par The clinical benefit of the proposed procedure outweighs the associated cardiovascular risk.  \par Risk not attenuated with further CV testing.  \par Prior testing as outlined above.\par Optimized from a cardiovascular perspective.\par Minimize time off ASA, may hold up to 7 days prior\par Continue beta blocker, ARB in perioperative period\par DVT ppx\par Even fluid balance \par \par #2 HTN -  Blood pressure stable.  Continue present regimen of medications. Continued low salt diet.\par She will follow her blood pressure.  Goal less than 130/80.  \par \par #3 Lower extremity edema. Continue compression stockings and prn lasix. Well controlled. \par \par #4 Graves' disease. F/U PCP. \par \par #5 mild aortic insufficiency, borderline pulmonary hypertension. Recent echo shows  mild/stable findings. \par \par #6 hyperlipidemia. Continue statin therapy.  Tolerating it well.\par \par Please do not hesitate to call for any questions or concerns. \par \par Sincerely,\par \par VERONIQUE Calhoun\par Patients history, testing, and plan reviewed with supervising MD: Dr. Morris Felder

## 2020-12-09 NOTE — CARDIOLOGY SUMMARY
[___] : [unfilled] [LVEF ___%] : LVEF [unfilled]% [Normal] : normal LA size [Mild] : mild mitral regurgitation [___] : [unfilled]

## 2020-12-09 NOTE — HISTORY OF PRESENT ILLNESS
[Preoperative Visit] : for a medical evaluation prior to surgery [Scheduled Procedure ___] : a [unfilled] [Date of Surgery ___] : on [unfilled] [Surgeon Name ___] : surgeon: [unfilled] [de-identified] : FREDY [FreeTextEntry1] : \par 75F with PMH of \par Graves' disease with recent changes in medications.\par Essential hypertension without any history of congestive heart failure, renal insufficiency. She is a nonsmoker.\par Hyperlipidemia. On statin therapy.\par Rheumatoid arthritis.\par Gastroesophageal reflux disease.\par LE edema, chronic and well controlled with intermittent use of lasix. \par \par She has difficulty with ambulation r/t back pain. She is not limited by any exertional complaints. Denies exertional chest pain or discomfort. Denies unusual shortness of breath, orthopnea, weight gain. Denies palpitations, lightheadedness, dizziness, or syncope.  \par \par

## 2021-02-09 ENCOUNTER — RX RENEWAL (OUTPATIENT)
Age: 76
End: 2021-02-09

## 2021-04-28 ENCOUNTER — APPOINTMENT (OUTPATIENT)
Dept: CARDIOLOGY | Facility: CLINIC | Age: 76
End: 2021-04-28
Payer: MEDICARE

## 2021-04-28 VITALS
TEMPERATURE: 98.4 F | SYSTOLIC BLOOD PRESSURE: 130 MMHG | DIASTOLIC BLOOD PRESSURE: 54 MMHG | BODY MASS INDEX: 27.46 KG/M2 | WEIGHT: 160 LBS

## 2021-04-28 DIAGNOSIS — Z01.810 ENCOUNTER FOR PREPROCEDURAL CARDIOVASCULAR EXAMINATION: ICD-10-CM

## 2021-04-28 PROCEDURE — 99214 OFFICE O/P EST MOD 30 MIN: CPT

## 2021-04-28 RX ORDER — AMLODIPINE BESYLATE 5 MG/1
5 TABLET ORAL
Qty: 90 | Refills: 3 | Status: DISCONTINUED | COMMUNITY
Start: 1900-01-01 | End: 2021-04-28

## 2021-05-12 ENCOUNTER — APPOINTMENT (OUTPATIENT)
Dept: CARDIOLOGY | Facility: CLINIC | Age: 76
End: 2021-05-12
Payer: MEDICARE

## 2021-05-12 ENCOUNTER — NON-APPOINTMENT (OUTPATIENT)
Age: 76
End: 2021-05-12

## 2021-05-12 VITALS
HEART RATE: 53 BPM | WEIGHT: 151 LBS | TEMPERATURE: 97.1 F | OXYGEN SATURATION: 98 % | HEIGHT: 64 IN | DIASTOLIC BLOOD PRESSURE: 56 MMHG | BODY MASS INDEX: 25.78 KG/M2 | SYSTOLIC BLOOD PRESSURE: 112 MMHG

## 2021-05-12 PROCEDURE — 99214 OFFICE O/P EST MOD 30 MIN: CPT

## 2021-05-12 RX ORDER — LEVOTHYROXINE SODIUM 0.09 MG/1
88 TABLET ORAL DAILY
Qty: 90 | Refills: 2 | Status: ACTIVE | COMMUNITY

## 2021-06-08 ENCOUNTER — APPOINTMENT (OUTPATIENT)
Dept: CARDIOLOGY | Facility: CLINIC | Age: 76
End: 2021-06-08

## 2021-06-10 ENCOUNTER — RX RENEWAL (OUTPATIENT)
Age: 76
End: 2021-06-10

## 2021-09-29 ENCOUNTER — RX RENEWAL (OUTPATIENT)
Age: 76
End: 2021-09-29

## 2021-11-18 ENCOUNTER — TRANSCRIPTION ENCOUNTER (OUTPATIENT)
Age: 76
End: 2021-11-18

## 2021-12-29 ENCOUNTER — NON-APPOINTMENT (OUTPATIENT)
Age: 76
End: 2021-12-29

## 2021-12-29 ENCOUNTER — APPOINTMENT (OUTPATIENT)
Dept: CARDIOLOGY | Facility: CLINIC | Age: 76
End: 2021-12-29
Payer: MEDICARE

## 2021-12-29 VITALS
HEIGHT: 64 IN | BODY MASS INDEX: 25.78 KG/M2 | HEART RATE: 53 BPM | DIASTOLIC BLOOD PRESSURE: 64 MMHG | OXYGEN SATURATION: 95 % | SYSTOLIC BLOOD PRESSURE: 114 MMHG | WEIGHT: 151 LBS

## 2021-12-29 PROCEDURE — 93000 ELECTROCARDIOGRAM COMPLETE: CPT

## 2021-12-29 PROCEDURE — 99214 OFFICE O/P EST MOD 30 MIN: CPT

## 2021-12-29 RX ORDER — SIMVASTATIN 20 MG/1
20 TABLET, FILM COATED ORAL DAILY
Qty: 90 | Refills: 0 | Status: DISCONTINUED | COMMUNITY
Start: 1900-01-01 | End: 2021-12-29

## 2021-12-29 NOTE — HISTORY OF PRESENT ILLNESS
[FreeTextEntry1] : Her medical history mainly significant for\par Graves' disease with recent changes in medications.\par Essential hypertension without any history of congestive heart failure, renal insufficiency. She is a nonsmoker.\par Hyperlipidemia. On statin therapy.\par Rheumatoid arthritis with gray nodes syndrome.\par Gastroesophageal reflux disease.\par Osteoarthritis.  Status post back surgery.\par \par She has no prior CHF, MI, syncope\par

## 2021-12-29 NOTE — PHYSICAL EXAM
[General Appearance - Well Developed] : well developed [Normal Appearance] : normal appearance [Well Groomed] : well groomed [General Appearance - Well Nourished] : well nourished [No Deformities] : no deformities [General Appearance - In No Acute Distress] : no acute distress [] : no respiratory distress [Respiration, Rhythm And Depth] : normal respiratory rhythm and effort [Exaggerated Use Of Accessory Muscles For Inspiration] : no accessory muscle use [Auscultation Breath Sounds / Voice Sounds] : lungs were clear to auscultation bilaterally [Heart Rate And Rhythm] : heart rate and rhythm were normal [Heart Sounds] : normal S1 and S2 [Arterial Pulses Normal] : the arterial pulses were normal [Veins - Varicosity Changes] : no varicosital changes were noted in the lower extremities [Abnormal Walk] : normal gait [Nail Clubbing] : no clubbing of the fingernails [Cyanosis, Localized] : no localized cyanosis [Skin Color & Pigmentation] : normal skin color and pigmentation [No Venous Stasis] : no venous stasis [Oriented To Time, Place, And Person] : oriented to person, place, and time [Affect] : the affect was normal [Mood] : the mood was normal [No Anxiety] : not feeling anxious [FreeTextEntry1] : Trace bilateral lower extremity edema, systolic ejection murmur [No Xanthoma] : no  xanthoma was observed

## 2021-12-29 NOTE — DISCUSSION/SUMMARY
[FreeTextEntry1] : 76-year-old female with above medical history active medical problems as noted below\par \par #1 bilateral edema of the legs -clinically no evidence of ACS or CHF.  Improved.  Mild increased creatinine.  Continue low-dose of chlorthalidone.  Follow BMP closely.\par Repeat echocardiogram follow-up on mitral aortic insufficiency LV ejection fraction RV function and pulmonary artery systolic pressure.\par \par #2 HTN -well-controlled; continue carvedilol/chlorthalidone.  Goal less than 130/80.  Low-salt diet.\par 3.  Multiple risk factors for atherosclerotic vascular disease.  Postural dizziness.  Carotid Doppler study ordered\par 4.  Dyslipidemia.  As per insurance we will change simvastatin to atorvastatin.  Lifestyle risk factor modification reviewed.  Side effects discussed.  She will contact us for any change in her symptoms otherwise follow-up labs ordered.\par #5 Graves' disease. F/U PCP. \par #6 mild aortic insufficiency, borderline pulmonary hypertension.  Will follow echocardiogram\par \par Counseling regarding low saturated fat, salt and carbohydrate intake was reviewed. Active lifestyle and regular. Exercise along with weight management is advised.\par All the above were at length reviewed. Answered all the questions. Thank you very much for this kind referral. Please do not hesitate to give me a call for any question.\par Part of this transcription was done with voice recognition software and phonetically similar errors are common. I apologize for that. Please do not hesitate to call for any questions due to above.\par

## 2021-12-29 NOTE — CARDIOLOGY SUMMARY
[___] : [unfilled] [LVEF ___%] : LVEF [unfilled]% [Normal] : normal LA size [Mild] : mild mitral regurgitation [de-identified] : December 29, 2021.  Normal sinus rhythm

## 2021-12-29 NOTE — ASSESSMENT
[FreeTextEntry1] : past tests for reference\par Reviewed on January 31, 2019\par Labs, EKG, chest x-ray, CT of the chest from [facility] were reviewed.\par \par Reviewed on April 3, 2019\par Echocardiogram.. Reviewed by me. Ejection fraction 65% mild aortic insufficiency. Mild mitral regurgitation. No pericardial effusion. Pulmonary pressure around 40 mm mercury suggesting mild pulmonary hypertension.\par Chest x-ray are obtained by her recently for her pulmonary symptoms had shown persistent small left pleural effusion, otherwise no significant cardiovascular abnormality.\par CT of the chest in January 2019 at [facility] had shown also small left pleural effusion. No pericardial effusion.\par \par Reviewed on August 10, 2020.\par EKG August 10, 2020. Sinus bradycardia. Low voltage. Anterior infarct versus poor R wave progression.\par Most recent labs for CBC done on 8/4/2020 showed hemoglobin 12. Platelet count 230\par Rest of the other labs as ordered by me are pending to be done on Wednesday\par \par Reviewed on November 12, 2020\par Echocardiogram which was done on November 12, 2020 EF 65% mild mitral and aortic regurgitation mild tricuspid regurgitation PASP 44 mmHg\par \par Reviewed on December 29, 2021\par EKG normal sinus rhythm\par Labs from 12/20/2021 creatinine 1.25 potassium 4.5 sodium 140

## 2021-12-29 NOTE — REVIEW OF SYSTEMS
[Joint Pain] : joint pain [Joint Stiffness] : joint stiffness [Negative] : Heme/Lymph [Lower Ext Edema] : lower extremity edema [FreeTextEntry5] : See HPI

## 2021-12-29 NOTE — REASON FOR VISIT
[FreeTextEntry3] : Dr. Chilo Gregory [FreeTextEntry1] : 76-year-old  female patient with history of Graves' disease on medication, hypertension, dyslipidemia, right arthritis, GERD 1, s/p back surgery for discectomy L5-S1 on January 4, 2021\par And subsequent epidural injection with some improvement in her pain.  Activity level is still limited.\par \par Her lower extremity edema have improved.  She denies any chest pain, PND, orthopnea, diaphoresis, dizziness, palpitations, claudication symptoms .  Overall she feels well.\par No bleeding complications\par No recent hospital admission from cardiovascular point of view.

## 2022-01-19 ENCOUNTER — APPOINTMENT (OUTPATIENT)
Dept: CARDIOLOGY | Facility: CLINIC | Age: 77
End: 2022-01-19
Payer: MEDICARE

## 2022-01-19 PROCEDURE — 93880 EXTRACRANIAL BILAT STUDY: CPT

## 2022-01-19 PROCEDURE — 93306 TTE W/DOPPLER COMPLETE: CPT

## 2022-01-24 ENCOUNTER — NON-APPOINTMENT (OUTPATIENT)
Age: 77
End: 2022-01-24

## 2022-02-11 ENCOUNTER — RX RENEWAL (OUTPATIENT)
Age: 77
End: 2022-02-11

## 2022-03-09 RX ORDER — CHLORTHALIDONE 25 MG/1
25 TABLET ORAL DAILY
Qty: 90 | Refills: 1 | Status: DISCONTINUED | COMMUNITY
Start: 2021-04-28 | End: 2022-03-09

## 2022-03-11 RX ORDER — BUMETANIDE 1 MG/1
1 TABLET ORAL
Qty: 90 | Refills: 0 | Status: DISCONTINUED | COMMUNITY
Start: 2022-03-11 | End: 2022-03-11

## 2022-03-14 ENCOUNTER — RX CHANGE (OUTPATIENT)
Age: 77
End: 2022-03-14

## 2022-03-15 ENCOUNTER — RX CHANGE (OUTPATIENT)
Age: 77
End: 2022-03-15

## 2022-03-16 ENCOUNTER — NON-APPOINTMENT (OUTPATIENT)
Age: 77
End: 2022-03-16

## 2022-03-18 ENCOUNTER — NON-APPOINTMENT (OUTPATIENT)
Age: 77
End: 2022-03-18

## 2022-03-29 PROBLEM — E05.00 GRAVES' DISEASE: Status: ACTIVE | Noted: 2019-01-25

## 2022-03-30 ENCOUNTER — APPOINTMENT (OUTPATIENT)
Dept: CARDIOLOGY | Facility: CLINIC | Age: 77
End: 2022-03-30
Payer: MEDICARE

## 2022-03-30 VITALS
HEART RATE: 56 BPM | HEIGHT: 64.5 IN | TEMPERATURE: 97.1 F | OXYGEN SATURATION: 95 % | BODY MASS INDEX: 26.14 KG/M2 | SYSTOLIC BLOOD PRESSURE: 122 MMHG | WEIGHT: 155 LBS | DIASTOLIC BLOOD PRESSURE: 80 MMHG

## 2022-03-30 DIAGNOSIS — E05.00 THYROTOXICOSIS WITH DIFFUSE GOITER W/OUT THYROTOXIC CRISIS OR STORM: ICD-10-CM

## 2022-03-30 PROCEDURE — 99214 OFFICE O/P EST MOD 30 MIN: CPT

## 2022-03-30 NOTE — DISCUSSION/SUMMARY
[FreeTextEntry1] : LAURA BENZ  is a 77 year F  who presents today March 30, 2022 with the above history and the following active issues. \par \par Lower extremity swelling. Chlorthalidone recently changed to Ethacrynic acid 25mg QD. Bumex was not covered by insurance. Swelling is improved although not resolved. Recommend continue current medication regimen. Continue to wear compression stockings. Consultation with vascular Dr. Aguilar. \par \par HTN -well-controlled; continue carvedilol/chlorthalidone.  Goal less than 130/80.  Low-salt diet.\par Lifestyle and risk factor modification\par \par Multiple risk factors for atherosclerotic vascular disease.  Postural dizziness.  Carotid Doppler with non-obstructive disease.\par \par Dyslipidemia.  As per insurance we will change simvastatin to atorvastatin.  Lifestyle risk factor modification reviewed.  Side effects discussed.  She will contact us for any change in her symptoms otherwise follow-up labs ordered.\par \par Graves' disease. F/U PCP. \par \par Mild aortic insufficiency, borderline pulmonary hypertension.  Echocardiogram reviewed at visit today. \par \par Red flag symptoms which would warrant sooner emergent evaluation reviewed with the patient. \par Questions and concerns were addressed and answered. \par \par Sincerely,\par \par Skylar Ledezma PA-C\par Patients history, testing and plan reviewed with supervising MD: Dr. Jalen Everett

## 2022-03-30 NOTE — REVIEW OF SYSTEMS
[Lower Ext Edema] : lower extremity edema [Joint Pain] : joint pain [Joint Stiffness] : joint stiffness [Negative] : Heme/Lymph [FreeTextEntry5] : See HPI

## 2022-03-30 NOTE — CARDIOLOGY SUMMARY
[___] : [unfilled] [LVEF ___%] : LVEF [unfilled]% [Normal] : normal LA size [Mild] : mild mitral regurgitation [de-identified] : December 29, 2021.  Normal sinus rhythm [de-identified] : 1/19/22 EF 60-65%, mild MR, mild AR\par \par Echo November 12, 2020 EF 65% mild mitral and aortic regurgitation mild tricuspid regurgitation PASP 44 mmHg\par  [de-identified] : Carotid US 1/19/2022 non-obstructive disease\par \par Abd US 3/21/2016 no evidence of AAA

## 2022-03-30 NOTE — HISTORY OF PRESENT ILLNESS
[FreeTextEntry1] : LAURA BENZ  is a 77 year F  who presents today Mar 30, 2022 in clinical follow-up.\par  Overall she has been feeling well. There has been no recent illness or hospital stay. She has noticed increase in lower extremity swelling. Now off chlorthalidone and taking Ethacrynic acid 12.5mg QD. Swelling is improved although left leg remains more swollen than right. PCP had pt have venous duplex last week which was negative for DVT. There is no complaints of shortness of breath, orthopnea or PND. Follow-up labs from earlier this week with stable renal function. \par Today she denies chest pain, pressure, unusual shortness of breath, lightheadedness, dizziness, near syncope or syncope. \par \par Her medical history mainly significant for\par Graves' disease with recent changes in medications.\par Essential hypertension without any history of congestive heart failure, renal insufficiency. She is a nonsmoker.\par Hyperlipidemia. On statin therapy.\par Rheumatoid arthritis with gray nodes syndrome.\par Gastroesophageal reflux disease.\par Osteoarthritis.  Status post back surgery.\par \par She has no prior CHF, MI, syncope\par

## 2022-03-30 NOTE — PHYSICAL EXAM
[Well Developed] : well developed [Well Nourished] : well nourished [No Acute Distress] : no acute distress [Normal Conjunctiva] : normal conjunctiva [Normal Venous Pressure] : normal venous pressure [No Carotid Bruit] : no carotid bruit [Normal S1, S2] : normal S1, S2 [No Murmur] : no murmur [No Rub] : no rub [No Gallop] : no gallop [Clear Lung Fields] : clear lung fields [Good Air Entry] : good air entry [No Respiratory Distress] : no respiratory distress  [Soft] : abdomen soft [Non Tender] : non-tender [No Masses/organomegaly] : no masses/organomegaly [Normal Bowel Sounds] : normal bowel sounds [Normal Gait] : normal gait [No Edema] : no edema [No Cyanosis] : no cyanosis [No Clubbing] : no clubbing [No Varicosities] : no varicosities [No Rash] : no rash [No Skin Lesions] : no skin lesions [Moves all extremities] : moves all extremities [No Focal Deficits] : no focal deficits [Normal Speech] : normal speech [Alert and Oriented] : alert and oriented [Normal memory] : normal memory [de-identified] : bilateral lower extremity swelling L>R

## 2022-06-14 ENCOUNTER — APPOINTMENT (OUTPATIENT)
Dept: CARDIOLOGY | Facility: CLINIC | Age: 77
End: 2022-06-14

## 2022-07-14 ENCOUNTER — RX RENEWAL (OUTPATIENT)
Age: 77
End: 2022-07-14

## 2022-07-15 ENCOUNTER — APPOINTMENT (OUTPATIENT)
Dept: CARDIOLOGY | Facility: CLINIC | Age: 77
End: 2022-07-15

## 2022-07-15 ENCOUNTER — NON-APPOINTMENT (OUTPATIENT)
Age: 77
End: 2022-07-15

## 2022-07-15 VITALS
RESPIRATION RATE: 16 BRPM | DIASTOLIC BLOOD PRESSURE: 104 MMHG | SYSTOLIC BLOOD PRESSURE: 176 MMHG | OXYGEN SATURATION: 98 % | TEMPERATURE: 97.3 F | HEART RATE: 59 BPM | HEIGHT: 64 IN

## 2022-07-15 PROCEDURE — 99214 OFFICE O/P EST MOD 30 MIN: CPT

## 2022-07-15 RX ORDER — POTASSIUM CHLORIDE 750 MG/1
10 TABLET, FILM COATED, EXTENDED RELEASE ORAL
Qty: 90 | Refills: 1 | Status: DISCONTINUED | COMMUNITY
Start: 2021-04-28 | End: 2022-07-15

## 2022-07-15 RX ORDER — PREDNISONE 5 MG/1
5 TABLET ORAL
Qty: 30 | Refills: 0 | Status: ACTIVE | COMMUNITY

## 2022-07-15 RX ORDER — FLUOROMETHOLONE 1 MG/ML
0.1 SOLUTION/ DROPS OPHTHALMIC
Qty: 5 | Refills: 0 | Status: ACTIVE | COMMUNITY
Start: 2022-03-23

## 2022-07-15 RX ORDER — NEOMYCIN AND POLYMYXIN B SULFATES AND DEXAMETHASONE 3.5; 10000; 1 MG/G; [IU]/G; MG/G
3.5-10000-0.1 OINTMENT OPHTHALMIC
Qty: 4 | Refills: 0 | Status: ACTIVE | COMMUNITY
Start: 2022-03-23

## 2022-07-15 NOTE — PHYSICAL EXAM
[Well Developed] : well developed [Well Nourished] : well nourished [No Acute Distress] : no acute distress [Normal Conjunctiva] : normal conjunctiva [Normal Venous Pressure] : normal venous pressure [No Carotid Bruit] : no carotid bruit [Normal S1, S2] : normal S1, S2 [No Murmur] : no murmur [No Rub] : no rub [No Gallop] : no gallop [Clear Lung Fields] : clear lung fields [Good Air Entry] : good air entry [No Respiratory Distress] : no respiratory distress  [Soft] : abdomen soft [Non Tender] : non-tender [No Masses/organomegaly] : no masses/organomegaly [Normal Bowel Sounds] : normal bowel sounds [Normal Gait] : normal gait [No Edema] : no edema [No Cyanosis] : no cyanosis [No Clubbing] : no clubbing [No Varicosities] : no varicosities [No Rash] : no rash [No Skin Lesions] : no skin lesions [Moves all extremities] : moves all extremities [No Focal Deficits] : no focal deficits [Normal Speech] : normal speech [Alert and Oriented] : alert and oriented [Normal memory] : normal memory [de-identified] : bilateral lower extremity swelling L>R

## 2022-07-15 NOTE — CARDIOLOGY SUMMARY
[___] : [unfilled] [LVEF ___%] : LVEF [unfilled]% [Normal] : normal LA size [Mild] : mild mitral regurgitation [de-identified] : December 29, 2021.  Normal sinus rhythm [de-identified] : 1/19/22 EF 60-65%, mild MR, mild AR\par \par Echo November 12, 2020 EF 65% mild mitral and aortic regurgitation mild tricuspid regurgitation PASP 44 mmHg\par  [de-identified] : Carotid US 1/19/2022 non-obstructive disease\par \par Abd US 3/21/2016 no evidence of AAA

## 2022-07-15 NOTE — DISCUSSION/SUMMARY
[FreeTextEntry1] : LAURA BENZ  is a 77 year F  who presents today with the above history and the following active issues. \par \par Lower extremity swelling. Chlorthalidone recently changed to Ethacrynic acid 25mg QD. Bumex was not covered by insurance. Swelling is improved although not resolved. Recommend continue current medication regimen. Continue to wear compression stockings. Consultation with vascular Dr. Aguilar. \par \par HTN -well-controlled; continue carvedilol/chlorthalidone.  Goal less than 130/80.  Low-salt diet.\par Lifestyle and risk factor modification\par \par Multiple risk factors for atherosclerotic vascular disease.  Postural dizziness.  Carotid Doppler with non-obstructive disease.\par \par Dyslipidemia.  As per insurance we will change simvastatin to atorvastatin.  Lifestyle risk factor modification reviewed.  Side effects discussed.  She will contact us for any change in her symptoms otherwise follow-up labs ordered.\par Uncontrolled hypertension.  Low-sodium diet discussed.  We will increase the dose of carvedilol to 12.5 mg twice a day.  Follow-up blood pressure check in 1 week.\par

## 2022-07-15 NOTE — HISTORY OF PRESENT ILLNESS
[FreeTextEntry1] : LAURA BENZ  is a 77 year F  who presents today  in clinical follow-up.\par  Overall she has been feeling well. There has been no recent illness or hospital stay. She has noticed increase in lower extremity swelling. Now off chlorthalidone and taking Ethacrynic acid 12.5mg QD. Swelling is improved although left leg remains more swollen than right. PCP had pt have venous duplex last week which was negative for DVT. There is no complaints of shortness of breath, orthopnea or PND. Follow-up labs from earlier this week with stable renal function. \par Today she denies chest pain, pressure, unusual shortness of breath, lightheadedness, dizziness, near syncope or syncope. \par \par Her medical history mainly significant for\par Graves' disease with recent changes in medications.\par Essential hypertension without any history of congestive heart failure, renal insufficiency. She is a nonsmoker.\par Hyperlipidemia. On statin therapy.\par Rheumatoid arthritis with gray nodes syndrome.\par Gastroesophageal reflux disease.\par Osteoarthritis.  Status post back surgery.\par \par She has no prior CHF, MI, syncope\par

## 2022-07-22 ENCOUNTER — APPOINTMENT (OUTPATIENT)
Dept: CARDIOLOGY | Facility: CLINIC | Age: 77
End: 2022-07-22

## 2022-07-22 VITALS
HEIGHT: 64 IN | OXYGEN SATURATION: 97 % | HEART RATE: 54 BPM | SYSTOLIC BLOOD PRESSURE: 168 MMHG | DIASTOLIC BLOOD PRESSURE: 84 MMHG | BODY MASS INDEX: 26.12 KG/M2 | WEIGHT: 153 LBS

## 2022-07-22 PROCEDURE — 99214 OFFICE O/P EST MOD 30 MIN: CPT

## 2022-07-22 NOTE — DISCUSSION/SUMMARY
[FreeTextEntry1] : LAURA BENZ  is a 77 year F  who presents today with the above history and the following active issues. \par \par Lower extremity swelling. Chlorthalidone recently changed to Ethacrynic acid 25mg QD. Bumex was not covered by insurance. Swelling is improved although not resolved. Recommend continue current medication regimen. Continue to wear compression stockings. Consultation with vascular Dr. Aguilar. \par \par HTN  still uncontrolled; continue carvedilol/chlorthalidone.  Goal less than 130/80.  Low-salt diet.\par Lifestyle and risk factor modification\par Add hydralazine 25 mg 3 times a day.  Blood pressure follow-up at home and in the office in a week.\par \par Multiple risk factors for atherosclerotic vascular disease.  Postural dizziness.  Carotid Doppler with non-obstructive disease.\par \par Dyslipidemia.  As per insurance we will change simvastatin to atorvastatin.  Lifestyle risk factor modification reviewed.  Side effects discussed.  She will contact us for any change in her symptoms otherwise follow-up labs ordered.\par Uncontrolled hypertension.  Low-sodium diet discussed.  We will increase the dose of carvedilol to 12.5 mg twice a day.  Follow-up blood pressure check in 1 week.\par

## 2022-07-22 NOTE — PHYSICAL EXAM
[Well Developed] : well developed [Well Nourished] : well nourished [No Acute Distress] : no acute distress [Normal Conjunctiva] : normal conjunctiva [Normal Venous Pressure] : normal venous pressure [No Carotid Bruit] : no carotid bruit [Normal S1, S2] : normal S1, S2 [No Murmur] : no murmur [No Rub] : no rub [No Gallop] : no gallop [Clear Lung Fields] : clear lung fields [Good Air Entry] : good air entry [No Respiratory Distress] : no respiratory distress  [Soft] : abdomen soft [Non Tender] : non-tender [No Masses/organomegaly] : no masses/organomegaly [Normal Bowel Sounds] : normal bowel sounds [Normal Gait] : normal gait [No Edema] : no edema [No Cyanosis] : no cyanosis [No Clubbing] : no clubbing [No Varicosities] : no varicosities [No Rash] : no rash [No Skin Lesions] : no skin lesions [Moves all extremities] : moves all extremities [No Focal Deficits] : no focal deficits [Normal Speech] : normal speech [Alert and Oriented] : alert and oriented [Normal memory] : normal memory [de-identified] : bilateral lower extremity swelling L>R

## 2022-07-22 NOTE — CARDIOLOGY SUMMARY
[___] : [unfilled] [LVEF ___%] : LVEF [unfilled]% [Normal] : normal LA size [Mild] : mild mitral regurgitation [de-identified] : December 29, 2021.  Normal sinus rhythm [de-identified] : 1/19/22 EF 60-65%, mild MR, mild AR\par \par Echo November 12, 2020 EF 65% mild mitral and aortic regurgitation mild tricuspid regurgitation PASP 44 mmHg\par  [de-identified] : Carotid US 1/19/2022 non-obstructive disease\par \par Abd US 3/21/2016 no evidence of AAA

## 2022-09-21 ENCOUNTER — APPOINTMENT (OUTPATIENT)
Dept: CARDIOLOGY | Facility: CLINIC | Age: 77
End: 2022-09-21

## 2022-09-21 VITALS
DIASTOLIC BLOOD PRESSURE: 68 MMHG | WEIGHT: 154 LBS | SYSTOLIC BLOOD PRESSURE: 134 MMHG | BODY MASS INDEX: 26.29 KG/M2 | HEART RATE: 60 BPM | HEIGHT: 64 IN | OXYGEN SATURATION: 96 %

## 2022-09-21 PROCEDURE — 99214 OFFICE O/P EST MOD 30 MIN: CPT

## 2022-09-21 RX ORDER — DIPHENHYDRAMINE HYDROCHLORIDE, ZINC ACETATE 20; 1 MG/G; MG/G
CREAM TOPICAL
Refills: 0 | Status: DISCONTINUED | COMMUNITY
End: 2022-09-21

## 2022-09-21 RX ORDER — MULTIVITAMIN
TABLET ORAL
Refills: 0 | Status: DISCONTINUED | COMMUNITY
End: 2022-09-21

## 2022-09-21 RX ORDER — LOSARTAN POTASSIUM 100 MG/1
100 TABLET, FILM COATED ORAL DAILY
Qty: 90 | Refills: 3 | Status: DISCONTINUED | COMMUNITY
Start: 2020-08-10 | End: 2022-09-21

## 2022-09-21 RX ORDER — CARVEDILOL 6.25 MG/1
6.25 TABLET, FILM COATED ORAL
Qty: 180 | Refills: 3 | Status: DISCONTINUED | COMMUNITY
Start: 2020-02-03 | End: 2022-09-21

## 2022-09-21 NOTE — DISCUSSION/SUMMARY
[FreeTextEntry1] : LAURA BENZ  is a 77 year F  who presents today  with the above history and the following active issues. \par \par Lower extremity swelling. Chlorthalidone recently changed to Ethacrynic acid 25mg QD. Bumex was not covered by insurance. Swelling is improved although not resolved.  Also follow recommendation by vascular surgery for venous insufficiency\par \par HTN -mildly uncontrolled.  Recent significant elevation of blood pressure in your office.  We will decrease hydralazine to 25 mg twice daily.  Continue carvedilol 12.5 mg twice daily.  Change losartan to irbesartan 300 mg nightly.  More efficacious ARB.  Goal less than 130/80.  Low-salt diet.  Risk benefits alternatives side effects reviewed.  She will contact me for any change in her symptoms.\par Lifestyle and risk factor modification\par \par Multiple risk factors for atherosclerotic vascular disease.   Carotid Doppler with non-obstructive disease.\par \par Dyslipidemia.  A continue statin therapy.  Lifestyle modifications.  Low saturated fat carbohydrate intake\par \par Graves' disease. F/U PCP. \par \par Mild aortic insufficiency, borderline pulmonary hypertension.  Stable last echocardiogram\par \par Red flag symptoms which would warrant sooner emergent evaluation reviewed with the patient. \par Questions and concerns were addressed and answered. \par \par Counseling regarding low saturated fat, salt and carbohydrate intake was reviewed. Active lifestyle and regular. Exercise along with weight management is advised.\par All the above were at length reviewed. Answered all the questions. Thank you very much for this kind referral. Please do not hesitate to give me a call for any question.\par Part of this transcription was done with voice recognition software and phonetically similar errors are common. I apologize for that. Please do not hesitate to call for any questions due to above.\par \par Sincerely,\par Jalen Everett MD,FACC,FASE\par

## 2022-09-21 NOTE — REVIEW OF SYSTEMS
[Lower Ext Edema] : lower extremity edema [Joint Pain] : joint pain [Joint Stiffness] : joint stiffness [Negative] : Heme/Lymph [Feeling Fatigued] : feeling fatigued [FreeTextEntry5] : See HPI

## 2022-09-21 NOTE — CARDIOLOGY SUMMARY
[___] : [unfilled] [LVEF ___%] : LVEF [unfilled]% [Normal] : normal LA size [Mild] : mild mitral regurgitation [de-identified] : December 29, 2021.  Normal sinus rhythm [de-identified] : 1/19/22 EF 60-65%, mild MR, mild AR\par \par Echo November 12, 2020 EF 65% mild mitral and aortic regurgitation mild tricuspid regurgitation PASP 44 mmHg\par  [de-identified] : Carotid US 1/19/2022 non-obstructive disease\par \par Abd US 3/21/2016 no evidence of AAA

## 2022-09-21 NOTE — HISTORY OF PRESENT ILLNESS
[FreeTextEntry1] : LAURA BENZ  is a 77 year F  who presents today with complaint of elevated blood pressure.  Recent changes in medications.\par Edema has improved.  She is staying off salt.  1 drink of alcohol daily.  Activity stable.\par Today she denies chest pain, pressure, unusual shortness of breath, lightheadedness, dizziness, near syncope or syncope. \par \par Her medical history mainly significant for\par Graves' disease with recent changes in medications.  Ophthalmopathy\par Essential hypertension without any history of congestive heart failure, renal insufficiency. She is a nonsmoker.\par Hyperlipidemia. On statin therapy.\par Rheumatoid arthritis with gray nodes syndrome.\par Gastroesophageal reflux disease.\par Osteoarthritis.  Status post back surgery.\par \par She has no prior CHF, MI, syncope\par

## 2022-09-21 NOTE — PHYSICAL EXAM
[Well Developed] : well developed [Well Nourished] : well nourished [No Acute Distress] : no acute distress [Normal Venous Pressure] : normal venous pressure [No Carotid Bruit] : no carotid bruit [Normal S1, S2] : normal S1, S2 [No Rub] : no rub [No Gallop] : no gallop [Clear Lung Fields] : clear lung fields [Good Air Entry] : good air entry [No Respiratory Distress] : no respiratory distress  [Soft] : abdomen soft [Normal Gait] : normal gait [No Cyanosis] : no cyanosis [No Clubbing] : no clubbing [No Varicosities] : no varicosities [Moves all extremities] : moves all extremities [Normal Speech] : normal speech [Alert and Oriented] : alert and oriented [Normal Radial B/L] : normal radial B/L [Normal DP B/L] : normal DP B/L [Edema ___] : edema [unfilled] [de-identified] : No bruit

## 2022-09-21 NOTE — ASSESSMENT
[FreeTextEntry1] : Reviewed September 21, 2022.  Labs from July 11, 2022 reviewed sodium 145 potassium 4.1 creatinine 1.02

## 2022-12-21 ENCOUNTER — APPOINTMENT (OUTPATIENT)
Dept: CARDIOLOGY | Facility: CLINIC | Age: 77
End: 2022-12-21

## 2022-12-21 VITALS
DIASTOLIC BLOOD PRESSURE: 70 MMHG | SYSTOLIC BLOOD PRESSURE: 164 MMHG | BODY MASS INDEX: 26.8 KG/M2 | HEART RATE: 60 BPM | WEIGHT: 157 LBS | OXYGEN SATURATION: 95 % | HEIGHT: 64 IN

## 2022-12-21 PROCEDURE — 99214 OFFICE O/P EST MOD 30 MIN: CPT

## 2022-12-21 RX ORDER — DARBEPOETIN ALFA 500 UG/ML
500 INJECTION, SOLUTION INTRAVENOUS; SUBCUTANEOUS
Qty: 1 | Refills: 0 | Status: DISCONTINUED | COMMUNITY
Start: 2021-12-07 | End: 2022-12-21

## 2022-12-21 NOTE — DISCUSSION/SUMMARY
[FreeTextEntry1] : LAURA BENZ  is a 77 year F  who presents today  with the above history and the following active issues. \par \par Lower extremity swelling. Chlorthalidone recently changed to Ethacrynic acid 25mg QD. Bumex was not covered by insurance. Swelling is improved although not resolved.  Also follow recommendation by vascular surgery for venous insufficiency\par \par HTN -uncontrolled.  Recent significant elevation of blood pressure in your office.  Stable ventricular rate at rest.  Increase hydralazine to 50 mg twice daily.  Continue irbesartan 300 mg and carvedilol 12.5 mg twice daily. If continues to remain elevated we will have to consider changing carvedilol to labetalol.  Goal less than 130/80.  Low-salt diet.  Risk benefits alternatives side effects reviewed.  She will contact me for any change in her symptoms.\par Lifestyle and risk factor modification\par \par Multiple risk factors for atherosclerotic vascular disease.   Carotid Doppler with non-obstructive disease.\par \par Dyslipidemia.  A continue statin therapy.  Lifestyle modifications.  Low saturated fat carbohydrate intake\par \par Graves' disease. F/U PCP. \par \par Mild aortic insufficiency, borderline pulmonary hypertension.  Stable last echocardiogram\par \par Red flag symptoms which would warrant sooner emergent evaluation reviewed with the patient. \par Questions and concerns were addressed and answered. \par \par Counseling regarding low saturated fat, salt and carbohydrate intake was reviewed. Active lifestyle and regular. Exercise along with weight management is advised.\par All the above were at length reviewed. Answered all the questions. Thank you very much for this kind referral. Please do not hesitate to give me a call for any question.\par Part of this transcription was done with voice recognition software and phonetically similar errors are common. I apologize for that. Please do not hesitate to call for any questions due to above.\par \par Sincerely,\par Jalen Everett MD,FACC,CRISELDA\par

## 2022-12-21 NOTE — CARDIOLOGY SUMMARY
[___] : [unfilled] [LVEF ___%] : LVEF [unfilled]% [Normal] : normal LA size [Mild] : mild mitral regurgitation [de-identified] : December 29, 2021.  Normal sinus rhythm [de-identified] : 1/19/22 EF 60-65%, mild MR, mild AR\par \par Echo November 12, 2020 EF 65% mild mitral and aortic regurgitation mild tricuspid regurgitation PASP 44 mmHg\par  [de-identified] : Carotid US 1/19/2022 non-obstructive disease\par \par Abd US 3/21/2016 no evidence of AAA

## 2022-12-21 NOTE — ASSESSMENT
[FreeTextEntry1] : Reviewed September 21, 2022.  Labs from July 11, 2022 reviewed sodium 145 potassium 4.1 creatinine 1.02\par \par Reviewed on December 21, 2022\par Labs from October 17, 2022 reviewed sodium 142 potassium 4.1 creatinine 1.32.

## 2022-12-21 NOTE — REVIEW OF SYSTEMS
[Feeling Fatigued] : feeling fatigued [Lower Ext Edema] : lower extremity edema [Joint Pain] : joint pain [Joint Stiffness] : joint stiffness [Negative] : Heme/Lymph [FreeTextEntry5] : See HPI

## 2022-12-21 NOTE — PHYSICAL EXAM
[Well Developed] : well developed [Well Nourished] : well nourished [No Acute Distress] : no acute distress [Normal Venous Pressure] : normal venous pressure [No Carotid Bruit] : no carotid bruit [Normal S1, S2] : normal S1, S2 [No Rub] : no rub [No Gallop] : no gallop [Clear Lung Fields] : clear lung fields [Good Air Entry] : good air entry [No Respiratory Distress] : no respiratory distress  [Soft] : abdomen soft [Normal Gait] : normal gait [No Cyanosis] : no cyanosis [No Clubbing] : no clubbing [No Varicosities] : no varicosities [Normal Radial B/L] : normal radial B/L [Normal DP B/L] : normal DP B/L [Edema ___] : edema [unfilled] [Moves all extremities] : moves all extremities [Normal Speech] : normal speech [Alert and Oriented] : alert and oriented [de-identified] : No bruit

## 2023-02-08 ENCOUNTER — APPOINTMENT (OUTPATIENT)
Dept: CARDIOLOGY | Facility: CLINIC | Age: 78
End: 2023-02-08
Payer: MEDICARE

## 2023-02-08 VITALS
HEIGHT: 64 IN | SYSTOLIC BLOOD PRESSURE: 140 MMHG | BODY MASS INDEX: 26.46 KG/M2 | DIASTOLIC BLOOD PRESSURE: 68 MMHG | WEIGHT: 155 LBS | OXYGEN SATURATION: 94 % | HEART RATE: 64 BPM

## 2023-02-08 PROCEDURE — 99214 OFFICE O/P EST MOD 30 MIN: CPT

## 2023-02-08 RX ORDER — ETHACRYNIC ACID 25 MG/1
25 TABLET ORAL DAILY
Qty: 90 | Refills: 3 | Status: ACTIVE | COMMUNITY
Start: 2022-03-11 | End: 1900-01-01

## 2023-02-08 NOTE — CARDIOLOGY SUMMARY
[___] : [unfilled] [LVEF ___%] : LVEF [unfilled]% [___] : [unfilled] [Normal] : normal LA size [Mild] : mild mitral regurgitation [de-identified] : December 29, 2021.  Normal sinus rhythm [de-identified] : 1/19/22 EF 60-65%, mild MR, mild AR\par \par Echo November 12, 2020 EF 65% mild mitral and aortic regurgitation mild tricuspid regurgitation PASP 44 mmHg\par  [de-identified] : Carotid US 1/19/2022 non-obstructive disease\par \par Abd US 3/21/2016 no evidence of AAA [de-identified] : Renal artery Doppler study January 2023.  No significant evidence of renal artery stenosis.  Kidney is somewhat smaller in size without distinct cortical thinning.

## 2023-02-08 NOTE — REVIEW OF SYSTEMS
X Size Of Lesion In Cm (Optional): 0.9 [Feeling Fatigued] : feeling fatigued [Lower Ext Edema] : lower extremity edema [Joint Pain] : joint pain [Joint Stiffness] : joint stiffness [Negative] : Heme/Lymph [FreeTextEntry5] : See HPI

## 2023-02-08 NOTE — HISTORY OF PRESENT ILLNESS
[FreeTextEntry1] : LAURA BENZ  is a 77 year F  who presents today with complaint of elevated blood pressure.  Recent changes in medications.  Mild headache associated with increased dose of hydralazine.  She is getting better with that.  Today she has significant flareup of her rheumatoid arthritis.  And has taken higher dose of prednisone for that\par Edema has improved.  She is staying off salt.  1 drink of alcohol daily.  Activity stable.\par Today she denies chest pain, pressure, unusual shortness of breath, lightheadedness, dizziness, near syncope or syncope. \par \par Her medical history mainly significant for\par Graves' disease with recent changes in medications.  Ophthalmopathy\par Essential hypertension without any history of congestive heart failure, renal insufficiency. She is a nonsmoker.\par Hyperlipidemia. On statin therapy.\par Rheumatoid arthritis with gray nodes syndrome.\par Gastroesophageal reflux disease.\par Osteoarthritis.  Status post back surgery.\par \par She has no prior CHF, MI, syncope\par

## 2023-02-08 NOTE — ASSESSMENT
[FreeTextEntry1] : Reviewed September 21, 2022.  Labs from July 11, 2022 reviewed sodium 145 potassium 4.1 creatinine 1.02\par \par Reviewed on December 21, 2022\par Labs from October 17, 2022 reviewed sodium 142 potassium 4.1 creatinine 1.32.\par \par Reviewed on February 8, 2023\par Renal artery Doppler study was reviewed as noted above.\par Labs January 13, 2023 hemoglobin 11.0 sodium 143 potassium 4.2 creatinine 1.4

## 2023-02-08 NOTE — REASON FOR VISIT
[Symptom and Test Evaluation] : symptom and test evaluation [Hyperlipidemia] : hyperlipidemia [Hypertension] : hypertension [FreeTextEntry3] : Dr. Chilo Gregory

## 2023-02-08 NOTE — PHYSICAL EXAM
[Well Developed] : well developed [Well Nourished] : well nourished [No Acute Distress] : no acute distress [Normal Venous Pressure] : normal venous pressure [No Carotid Bruit] : no carotid bruit [Normal S1, S2] : normal S1, S2 [No Rub] : no rub [No Gallop] : no gallop [Clear Lung Fields] : clear lung fields [Good Air Entry] : good air entry [No Respiratory Distress] : no respiratory distress  [Soft] : abdomen soft [Normal Gait] : normal gait [No Cyanosis] : no cyanosis [No Clubbing] : no clubbing [Normal Radial B/L] : normal radial B/L [Moves all extremities] : moves all extremities [Normal Speech] : normal speech [Alert and Oriented] : alert and oriented [Edema ___] : edema [unfilled] [de-identified] : No bruit

## 2023-04-26 ENCOUNTER — OFFICE (OUTPATIENT)
Dept: URBAN - METROPOLITAN AREA CLINIC 8 | Facility: CLINIC | Age: 78
Setting detail: OPHTHALMOLOGY
End: 2023-04-26
Payer: MEDICARE

## 2023-04-26 ENCOUNTER — RX ONLY (RX ONLY)
Age: 78
End: 2023-04-26

## 2023-04-26 DIAGNOSIS — H16.223: ICD-10-CM

## 2023-04-26 DIAGNOSIS — H26.493: ICD-10-CM

## 2023-04-26 DIAGNOSIS — H02.015: ICD-10-CM

## 2023-04-26 DIAGNOSIS — H02.403: ICD-10-CM

## 2023-04-26 DIAGNOSIS — H43.811: ICD-10-CM

## 2023-04-26 PROCEDURE — 99213 OFFICE O/P EST LOW 20 MIN: CPT | Performed by: OPHTHALMOLOGY

## 2023-04-26 PROCEDURE — 67820 REVISE EYELASHES: CPT | Performed by: OPHTHALMOLOGY

## 2023-04-26 ASSESSMENT — LID POSITION - DERMATOCHALASIS
OD_DERMATOCHALASIS: 2+
OS_DERMATOCHALASIS: 2+

## 2023-04-26 ASSESSMENT — REFRACTION_CURRENTRX
OS_VPRISM_DIRECTION: SV
OD_ADD: +2.50
OS_OVR_VA: 20/
OS_ADD: +2.50
OD_OVR_VA: 20/
OD_VPRISM_DIRECTION: SV

## 2023-04-26 ASSESSMENT — LID EXAM ASSESSMENTS: OS_TRICHIASIS: LLL

## 2023-04-26 ASSESSMENT — REFRACTION_MANIFEST
OD_AXIS: 175
OS_SPHERE: -0.25
OD_SPHERE: PLANO
OD_ADD: +2.50
OD_CYLINDER: -0.50
OD_VA2: 20/20(J1+)
OS_ADD: +2.50
OS_VA1: 20/25-
OU_VA: 20/25+3
OS_CYLINDER: -0.50
OD_VA1: 20/25-2
OS_VA2: 20/20(J1+)
OS_AXIS: 170

## 2023-04-26 ASSESSMENT — REFRACTION_AUTOREFRACTION
OD_AXIS: 175
OS_SPHERE: PLANO
OD_SPHERE: -0.25
OS_CYLINDER: -2.00
OD_CYLINDER: -2.50
OS_AXIS: 169

## 2023-04-26 ASSESSMENT — AXIALLENGTH_DERIVED
OD_AL: 24.0472
OS_AL: 23.4207

## 2023-04-26 ASSESSMENT — TEAR BREAK UP TIME (TBUT)
OD_TBUT: 8-10 SECS
OS_TBUT: 8-10 SECS

## 2023-04-26 ASSESSMENT — KERATOMETRY
METHOD_AUTO_MANUAL: AUTO
OS_K2POWER_DIOPTERS: 45.50
OS_K1POWER_DIOPTERS: 43.50
OS_AXISANGLE_DEGREES: 090
OD_K2POWER_DIOPTERS: 44.75
OD_AXISANGLE_DEGREES: 087
OD_K1POWER_DIOPTERS: 43.00

## 2023-04-26 ASSESSMENT — VISUAL ACUITY
OS_BCVA: 20/30-
OD_BCVA: 20/30-2

## 2023-04-26 ASSESSMENT — SPHEQUIV_DERIVED
OD_SPHEQUIV: -1.5
OS_SPHEQUIV: -0.5

## 2023-04-26 ASSESSMENT — LID POSITION - PTOSIS
OS_PTOSIS: 1+
OD_PTOSIS: 1+

## 2023-04-26 ASSESSMENT — CONFRONTATIONAL VISUAL FIELD TEST (CVF)
OD_FINDINGS: FULL
OS_FINDINGS: FULL

## 2023-05-17 ENCOUNTER — NON-APPOINTMENT (OUTPATIENT)
Age: 78
End: 2023-05-17

## 2023-05-17 ENCOUNTER — APPOINTMENT (OUTPATIENT)
Dept: CARDIOLOGY | Facility: CLINIC | Age: 78
End: 2023-05-17
Payer: MEDICARE

## 2023-05-17 VITALS
HEIGHT: 64 IN | SYSTOLIC BLOOD PRESSURE: 128 MMHG | DIASTOLIC BLOOD PRESSURE: 62 MMHG | WEIGHT: 155 LBS | HEART RATE: 57 BPM | OXYGEN SATURATION: 95 % | BODY MASS INDEX: 26.46 KG/M2

## 2023-05-17 DIAGNOSIS — Z87.898 PERSONAL HISTORY OF OTHER SPECIFIED CONDITIONS: ICD-10-CM

## 2023-05-17 DIAGNOSIS — R42 DIZZINESS AND GIDDINESS: ICD-10-CM

## 2023-05-17 PROCEDURE — 93000 ELECTROCARDIOGRAM COMPLETE: CPT

## 2023-05-17 PROCEDURE — 99214 OFFICE O/P EST MOD 30 MIN: CPT

## 2023-05-17 NOTE — HISTORY OF PRESENT ILLNESS
[FreeTextEntry1] : LAURA BENZ  is a 78 year F  who presents today with complaint of improved but still mild bilateral lower extremity edema\par Her blood pressure is relatively well controlled.  She is staying off salt.  1 drink of alcohol daily.  Activity stable.\par Today she denies chest pain, pressure, unusual shortness of breath, lightheadedness, dizziness, near syncope or syncope. \par \par Her medical history mainly significant for\par Graves' disease with recent changes in medications.  Ophthalmopathy\par Essential hypertension without any history of congestive heart failure, renal insufficiency. She is a nonsmoker.\par Hyperlipidemia. On statin therapy.\par Rheumatoid arthritis with gray nodes syndrome.\par Gastroesophageal reflux disease.\par Osteoarthritis.  Status post back surgery.\par \par She has no prior CHF, MI, syncope\par

## 2023-05-17 NOTE — DISCUSSION/SUMMARY
[FreeTextEntry1] : LAURA BENZ  is a 78 year F  who presents today  with the above history and the following active issues. \par \par Lower extremity swelling.  Significant sulfa allergies.  Chlorthalidone recently changed to Ethacrynic acid 25mg QD. Bumex was not covered by insurance. Swelling is improved although not resolved.  Also follow recommendation by vascular surgery for venous insufficiency\par \par HTN -better controlled continue present regimen of medications if needed to decrease hydralazine change carvedilol to labetalol.\par Goal less than 130/80.  Low-salt diet.  Risk benefits alternatives side effects reviewed.  She will contact me for any change in her symptoms.\par Lifestyle and risk factor modification\par No evidence of renal artery stenosis.\par Follow-up on labs which includes running out of and metanephrine.\par \par Multiple risk factors for atherosclerotic vascular disease.   Carotid Doppler with non-obstructive disease.  Carotid Doppler study ordered\par \par Dyslipidemia.  A continue statin therapy.  Lifestyle modifications.  Low saturated fat carbohydrate intake\par \par Graves' disease. F/U PCP. \par \par Nonrheumatic aortic insufficiency, pulmonary hypertension.  Follow-up echocardiogram.  Needed in presence of hypertension, murmur, follow-up on aortic regurgitation and pulmonary pretension\par \par \par Red flag symptoms which would warrant sooner emergent evaluation reviewed with the patient. \par Questions and concerns were addressed and answered. \par \par Counseling regarding low saturated fat, salt and carbohydrate intake was reviewed. Active lifestyle and regular. Exercise along with weight management is advised.\par All the above were at length reviewed. Answered all the questions. Thank you very much for this kind referral. Please do not hesitate to give me a call for any question.\par Part of this transcription was done with voice recognition software and phonetically similar errors are common. I apologize for that. Please do not hesitate to call for any questions due to above.\par \par Sincerely,\par Jalen Everett MD,FACC,CRISELDA\par

## 2023-05-17 NOTE — PHYSICAL EXAM
[Well Developed] : well developed [Well Nourished] : well nourished [No Acute Distress] : no acute distress [Normal Venous Pressure] : normal venous pressure [No Carotid Bruit] : no carotid bruit [Normal S1, S2] : normal S1, S2 [No Rub] : no rub [No Gallop] : no gallop [Clear Lung Fields] : clear lung fields [Good Air Entry] : good air entry [No Respiratory Distress] : no respiratory distress  [Soft] : abdomen soft [Normal Gait] : normal gait [No Cyanosis] : no cyanosis [No Clubbing] : no clubbing [Normal Radial B/L] : normal radial B/L [Edema ___] : edema [unfilled] [Moves all extremities] : moves all extremities [Normal Speech] : normal speech [Alert and Oriented] : alert and oriented [de-identified] : Murmur at the base [de-identified] : No bruit

## 2023-05-17 NOTE — ASSESSMENT
[FreeTextEntry1] : Reviewed September 21, 2022.  Labs from July 11, 2022 reviewed sodium 145 potassium 4.1 creatinine 1.02\par \par Reviewed on December 21, 2022\par Labs from October 17, 2022 reviewed sodium 142 potassium 4.1 creatinine 1.32.\par \par Reviewed on February 8, 2023\par Renal artery Doppler study was reviewed as noted above.\par Labs January 13, 2023 hemoglobin 11.0 sodium 143 potassium 4.2 creatinine 1.4\par \par Reviewed on May 17, 2023.\par EKG noted.\par Most recent labs were reviewed.  Which showed sodium 145 potassium 4.1 creatinine 1.21 LFT stable.  GFR 45

## 2023-08-22 RX ORDER — POTASSIUM CHLORIDE 750 MG/1
10 TABLET, EXTENDED RELEASE ORAL
Qty: 90 | Refills: 3 | Status: ACTIVE | COMMUNITY
Start: 2022-07-14 | End: 1900-01-01

## 2023-10-28 ENCOUNTER — RX ONLY (RX ONLY)
Age: 78
End: 2023-10-28

## 2023-10-28 ENCOUNTER — OFFICE (OUTPATIENT)
Dept: URBAN - METROPOLITAN AREA CLINIC 8 | Facility: CLINIC | Age: 78
Setting detail: OPHTHALMOLOGY
End: 2023-10-28
Payer: MEDICARE

## 2023-10-28 DIAGNOSIS — H02.403: ICD-10-CM

## 2023-10-28 DIAGNOSIS — H26.493: ICD-10-CM

## 2023-10-28 DIAGNOSIS — H02.015: ICD-10-CM

## 2023-10-28 DIAGNOSIS — H16.223: ICD-10-CM

## 2023-10-28 DIAGNOSIS — H02.831: ICD-10-CM

## 2023-10-28 DIAGNOSIS — Z96.1: ICD-10-CM

## 2023-10-28 DIAGNOSIS — H43.811: ICD-10-CM

## 2023-10-28 DIAGNOSIS — H02.834: ICD-10-CM

## 2023-10-28 PROCEDURE — 99213 OFFICE O/P EST LOW 20 MIN: CPT | Performed by: OPHTHALMOLOGY

## 2023-10-28 ASSESSMENT — SPHEQUIV_DERIVED
OS_SPHEQUIV: -0.5
OD_SPHEQUIV: -1.5

## 2023-10-28 ASSESSMENT — REFRACTION_MANIFEST
OS_CYLINDER: -0.50
OD_SPHERE: PLANO
OD_AXIS: 175
OD_ADD: +2.50
OS_SPHERE: -0.25
OD_VA1: 20/25-2
OD_CYLINDER: -0.50
OS_ADD: +2.50
OS_AXIS: 170
OS_VA1: 20/25-
OU_VA: 20/25+3
OD_VA2: 20/20(J1+)
OS_VA2: 20/20(J1+)

## 2023-10-28 ASSESSMENT — REFRACTION_CURRENTRX
OD_ADD: +2.50
OD_OVR_VA: 20/
OD_VPRISM_DIRECTION: SV
OS_OVR_VA: 20/
OS_ADD: +2.50
OS_VPRISM_DIRECTION: SV

## 2023-10-28 ASSESSMENT — CONFRONTATIONAL VISUAL FIELD TEST (CVF)
OD_FINDINGS: FULL
OS_FINDINGS: FULL

## 2023-10-28 ASSESSMENT — REFRACTION_AUTOREFRACTION
OD_CYLINDER: -2.50
OD_AXIS: 175
OS_AXIS: 169
OS_CYLINDER: -2.00
OS_SPHERE: PLANO
OD_SPHERE: -0.25

## 2023-10-28 ASSESSMENT — LID POSITION - DERMATOCHALASIS
OS_DERMATOCHALASIS: 2+
OD_DERMATOCHALASIS: 2+

## 2023-10-28 ASSESSMENT — LID POSITION - PTOSIS
OD_PTOSIS: 1+
OS_PTOSIS: 1+

## 2023-10-28 ASSESSMENT — TEAR BREAK UP TIME (TBUT)
OD_TBUT: 8-10 SECS
OS_TBUT: 8-10 SECS

## 2023-10-28 ASSESSMENT — KERATOMETRY
OD_K1POWER_DIOPTERS: 43.00
OD_K2POWER_DIOPTERS: 44.75
OS_AXISANGLE_DEGREES: 090
METHOD_AUTO_MANUAL: AUTO
OS_K1POWER_DIOPTERS: 43.50
OS_K2POWER_DIOPTERS: 45.50
OD_AXISANGLE_DEGREES: 087

## 2023-10-28 ASSESSMENT — LID EXAM ASSESSMENTS: OS_TRICHIASIS: LLL

## 2023-10-28 ASSESSMENT — AXIALLENGTH_DERIVED
OD_AL: 24.0472
OS_AL: 23.4207

## 2023-10-28 ASSESSMENT — VISUAL ACUITY
OS_BCVA: 20/30
OD_BCVA: 20/30-

## 2023-11-07 ENCOUNTER — APPOINTMENT (OUTPATIENT)
Dept: CARDIOLOGY | Facility: CLINIC | Age: 78
End: 2023-11-07
Payer: MEDICARE

## 2023-11-07 VITALS
WEIGHT: 155 LBS | DIASTOLIC BLOOD PRESSURE: 64 MMHG | BODY MASS INDEX: 26.46 KG/M2 | HEIGHT: 64 IN | SYSTOLIC BLOOD PRESSURE: 138 MMHG | OXYGEN SATURATION: 93 % | HEART RATE: 55 BPM

## 2023-11-07 PROCEDURE — 93306 TTE W/DOPPLER COMPLETE: CPT

## 2023-11-07 PROCEDURE — 93880 EXTRACRANIAL BILAT STUDY: CPT

## 2023-11-07 PROCEDURE — 99214 OFFICE O/P EST MOD 30 MIN: CPT

## 2023-12-04 ENCOUNTER — RX RENEWAL (OUTPATIENT)
Age: 78
End: 2023-12-04

## 2024-01-16 RX ORDER — CARVEDILOL 25 MG/1
25 TABLET, FILM COATED ORAL
Qty: 180 | Refills: 3 | Status: ACTIVE | COMMUNITY
Start: 2023-12-04 | End: 1900-01-01

## 2024-01-22 ENCOUNTER — RX RENEWAL (OUTPATIENT)
Age: 79
End: 2024-01-22

## 2024-01-22 RX ORDER — HYDRALAZINE HYDROCHLORIDE 50 MG/1
50 TABLET ORAL
Qty: 180 | Refills: 3 | Status: ACTIVE | COMMUNITY
Start: 2022-07-22 | End: 1900-01-01

## 2024-01-25 ENCOUNTER — RX RENEWAL (OUTPATIENT)
Age: 79
End: 2024-01-25

## 2024-01-25 RX ORDER — ATORVASTATIN CALCIUM 20 MG/1
20 TABLET, FILM COATED ORAL DAILY
Qty: 90 | Refills: 3 | Status: ACTIVE | COMMUNITY
Start: 2021-12-29 | End: 1900-01-01

## 2024-03-08 ENCOUNTER — APPOINTMENT (OUTPATIENT)
Dept: ORTHOPEDIC SURGERY | Facility: CLINIC | Age: 79
End: 2024-03-08

## 2024-05-07 ENCOUNTER — APPOINTMENT (OUTPATIENT)
Dept: CARDIOLOGY | Facility: CLINIC | Age: 79
End: 2024-05-07
Payer: MEDICARE

## 2024-05-07 ENCOUNTER — NON-APPOINTMENT (OUTPATIENT)
Age: 79
End: 2024-05-07

## 2024-05-07 VITALS
WEIGHT: 155 LBS | OXYGEN SATURATION: 91 % | HEART RATE: 67 BPM | HEIGHT: 64 IN | SYSTOLIC BLOOD PRESSURE: 120 MMHG | BODY MASS INDEX: 26.46 KG/M2 | DIASTOLIC BLOOD PRESSURE: 64 MMHG

## 2024-05-07 DIAGNOSIS — I13.10 HYPERTENSIVE HEART AND CHRONIC KIDNEY DISEASE W/OUT HEART FAILURE, WITH STAGE 1 THROUGH STAGE 4 CHRONIC KIDNEY DISEASE, OR UNSPECIFIED CHRONIC KIDNEY DISEASE: ICD-10-CM

## 2024-05-07 DIAGNOSIS — I27.20 PULMONARY HYPERTENSION, UNSPECIFIED: ICD-10-CM

## 2024-05-07 DIAGNOSIS — I65.23 OCCLUSION AND STENOSIS OF BILATERAL CAROTID ARTERIES: ICD-10-CM

## 2024-05-07 DIAGNOSIS — I08.0 RHEUMATIC DISORDERS OF BOTH MITRAL AND AORTIC VALVES: ICD-10-CM

## 2024-05-07 PROCEDURE — 99214 OFFICE O/P EST MOD 30 MIN: CPT

## 2024-05-07 PROCEDURE — 93000 ELECTROCARDIOGRAM COMPLETE: CPT

## 2024-05-07 PROCEDURE — G2211 COMPLEX E/M VISIT ADD ON: CPT

## 2024-05-07 NOTE — ASSESSMENT
[FreeTextEntry1] :  reviewed on November 7, 2023. Echocardiogram and carotid Doppler study as noted above Recent labs October 30, 2023Stable CTA total cholesterol 175 triglycerides 67 HDL 81 LDL 79.  TSH 1.9.  Creatinine 1.19 BUN 26 sodium 147 potassium 3.9 LFT otherwise stable  Reviewed on May 7, 2024.  EKG as noted above Labs from May 6, 2024 sodium 143 potassium 4.9 creatinine 1.35 LFT normal.  N-terminal proBNP 878.

## 2024-05-07 NOTE — PHYSICAL EXAM
[Well Developed] : well developed [Well Nourished] : well nourished [No Acute Distress] : no acute distress [Normal Venous Pressure] : normal venous pressure [Normal S1, S2] : normal S1, S2 [No Rub] : no rub [No Gallop] : no gallop [Clear Lung Fields] : clear lung fields [Good Air Entry] : good air entry [No Respiratory Distress] : no respiratory distress  [Soft] : abdomen soft [Normal Gait] : normal gait [No Cyanosis] : no cyanosis [No Clubbing] : no clubbing [Normal Radial B/L] : normal radial B/L [Edema ___] : edema [unfilled] [Moves all extremities] : moves all extremities [Normal Speech] : normal speech [Alert and Oriented] : alert and oriented [Abnormal Gait] : abnormal gait [de-identified] :  carotid bruit [de-identified] :   Systolic ejection murmur 2/6 [de-identified] : No bruit

## 2024-05-07 NOTE — HISTORY OF PRESENT ILLNESS
[FreeTextEntry1] : LAURA BENZ  is a 79 year F  who presents today with worsening of lower extremity edema.  She did not take diuretics for 13 days during her recent travel.  She also has arthritic condition.  Walking with a walker.  Going to chiropractor as well as acupuncture's.  Gradually improving.  She has started taking her diuretics again.  With slow and gradual improvement. Her blood pressure is relatively well controlled.  She is staying off salt.  1 drink of alcohol daily.  Activity stable. Today she denies chest pain, pressure, unusual shortness of breath, lightheadedness, dizziness, near syncope or syncope.   Her medical history mainly significant for Graves' disease with recent changes in medications.  Ophthalmopathy Essential hypertension without any history of congestive heart failure, renal insufficiency. She is a nonsmoker. Hyperlipidemia. On statin therapy. Rheumatoid arthritis with gray nodes syndrome. Gastroesophageal reflux disease. Osteoarthritis.  Status post back surgery.  She has no prior CHF, MI, syncope

## 2024-05-07 NOTE — DISCUSSION/SUMMARY
[FreeTextEntry1] : LAURA BENZ  is a 79 year F  who presents today  with the above history and the following active issues.   Lower extremity swelling.  Significant sulfa allergies.  Chlorthalidone recently changed to Ethacrynic acid 25mg QD. Bumex was not covered by insurance.  Swelling got worse because of nonadherence to medical regimen.  She has restarted it.  She will continue with low-salt diet.  And compression stockings.  If no improvement she will contact us so that appropriate short-term adjustment can be done.  Also follow recommendation by vascular surgery for venous insufficiency in the form of compression stockings.  HTN -  Borderline controlled.  No significant side effects with higher dose of carvedilol.  Continue also hydralazine and ARB. if needed to decrease hydralazine change carvedilol to labetalol. Goal less than 130/80.  Low-salt diet.  Risk benefits alternatives side effects reviewed.  She will contact me for any change in her symptoms. Lifestyle and risk factor modification No evidence of renal artery stenosis.  Moderate left ICA disease.  Tortuosity may increase velocity to give higher stenosis on a Doppler study.  Asymptomatic otherwise.  Continue with aspirin and statin therapy.  Follow serially.  Dyslipidemia.  A continue statin therapy.  Lifestyle modifications.  Low saturated fat carbohydrate intake  Graves' disease. F/U PCP.   Nonrheumatic aortic insufficiency/AS, pulmonary hypertension.  Follow-up echocardiogram .  Red flag symptoms which would warrant sooner emergent evaluation reviewed with the patient.  Questions and concerns were addressed and answered.   Counseling regarding low saturated fat, salt and carbohydrate intake was reviewed. Active lifestyle and regular. Exercise along with weight management is advised. All the above were at length reviewed. Answered all the questions. Thank you very much for this kind referral. Please do not hesitate to give me a call for any question. Part of this transcription was done with voice recognition software and phonetically similar errors are common. I apologize for that. Please do not hesitate to call for any questions due to above.  Sincerely, Jalen Everett MD,[facility],[place]  [EKG obtained to assist in diagnosis and management of assessed problem(s)] : EKG obtained to assist in diagnosis and management of assessed problem(s)

## 2024-05-07 NOTE — CARDIOLOGY SUMMARY
[___] : [unfilled] [LVEF ___%] : LVEF [unfilled]% [Normal] : normal LA size [Mild] : mild mitral regurgitation [de-identified] : December 29, 2021.  Normal sinus rhythm 5/17/2023 normal sinus rhythm poor R wave progression nonspecific ST-T change May 7, 2024.  Normal sinus rhythm poor R wave progression [de-identified] : 1/19/22 EF 60-65%, mild MR, mild AR Echo November 12, 2020 EF 65% mild mitral and aortic regurgitation mild tricuspid regurgitation PASP 44 mmHg   Echocardiogram.  November 7, 2023.  LVEF 60 to 65%.  Dilated left atrium.  Mild MR AR mild to moderate aortic stenosis mild TR.  Pulmonary pressures normal.  [de-identified] : Carotid US 1/19/2022 non-obstructive disease Bilateral carotid Doppler study. November 7, 2023.  Significant tortuosity.  Left ICA 50 to 69%. Abd US 3/21/2016 no evidence of AAA [de-identified] : Renal artery Doppler study January 2023.  No significant evidence of renal artery stenosis.  Kidney is somewhat smaller in size without distinct cortical thinning.

## 2024-05-15 ENCOUNTER — OFFICE (OUTPATIENT)
Dept: URBAN - METROPOLITAN AREA CLINIC 8 | Facility: CLINIC | Age: 79
Setting detail: OPHTHALMOLOGY
End: 2024-05-15
Payer: MEDICARE

## 2024-05-15 DIAGNOSIS — E05.00: ICD-10-CM

## 2024-05-15 DIAGNOSIS — H02.403: ICD-10-CM

## 2024-05-15 DIAGNOSIS — H02.834: ICD-10-CM

## 2024-05-15 DIAGNOSIS — H16.223: ICD-10-CM

## 2024-05-15 DIAGNOSIS — H02.831: ICD-10-CM

## 2024-05-15 DIAGNOSIS — Z96.1: ICD-10-CM

## 2024-05-15 DIAGNOSIS — H02.015: ICD-10-CM

## 2024-05-15 DIAGNOSIS — H26.492: ICD-10-CM

## 2024-05-15 DIAGNOSIS — H43.811: ICD-10-CM

## 2024-05-15 PROCEDURE — 92014 COMPRE OPH EXAM EST PT 1/>: CPT | Performed by: OPHTHALMOLOGY

## 2024-05-15 ASSESSMENT — LID POSITION - PTOSIS
OS_PTOSIS: 1+
OD_PTOSIS: 1+

## 2024-05-15 ASSESSMENT — CONFRONTATIONAL VISUAL FIELD TEST (CVF)
OS_FINDINGS: FULL
OD_FINDINGS: FULL

## 2024-05-15 ASSESSMENT — LID POSITION - DERMATOCHALASIS
OS_DERMATOCHALASIS: 2+
OD_DERMATOCHALASIS: 2+

## 2024-05-15 ASSESSMENT — LID EXAM ASSESSMENTS: OS_TRICHIASIS: ABSENT

## 2024-05-31 ENCOUNTER — NON-APPOINTMENT (OUTPATIENT)
Age: 79
End: 2024-05-31

## 2024-06-04 ENCOUNTER — APPOINTMENT (OUTPATIENT)
Dept: CARDIOLOGY | Facility: CLINIC | Age: 79
End: 2024-06-04
Payer: MEDICARE

## 2024-06-04 VITALS
HEIGHT: 64 IN | HEART RATE: 59 BPM | DIASTOLIC BLOOD PRESSURE: 70 MMHG | BODY MASS INDEX: 26.46 KG/M2 | SYSTOLIC BLOOD PRESSURE: 138 MMHG | WEIGHT: 155 LBS | OXYGEN SATURATION: 96 %

## 2024-06-04 DIAGNOSIS — R60.0 LOCALIZED EDEMA: ICD-10-CM

## 2024-06-04 DIAGNOSIS — E78.5 HYPERLIPIDEMIA, UNSPECIFIED: ICD-10-CM

## 2024-06-04 DIAGNOSIS — I10 ESSENTIAL (PRIMARY) HYPERTENSION: ICD-10-CM

## 2024-06-04 PROCEDURE — 99214 OFFICE O/P EST MOD 30 MIN: CPT

## 2024-06-04 RX ORDER — SPIRONOLACTONE 50 MG/1
50 TABLET ORAL
Qty: 90 | Refills: 3 | Status: ACTIVE | COMMUNITY
Start: 2024-06-04 | End: 1900-01-01

## 2024-06-04 NOTE — PHYSICAL EXAM
[Well Developed] : well developed [Well Nourished] : well nourished [No Acute Distress] : no acute distress [Normal Venous Pressure] : normal venous pressure [Normal S1, S2] : normal S1, S2 [No Rub] : no rub [No Gallop] : no gallop [Clear Lung Fields] : clear lung fields [Good Air Entry] : good air entry [No Respiratory Distress] : no respiratory distress  [Abnormal Gait] : abnormal gait [No Cyanosis] : no cyanosis [No Clubbing] : no clubbing [Normal Radial B/L] : normal radial B/L [Edema ___] : edema [unfilled] [Normal Speech] : normal speech [Alert and Oriented] : alert and oriented [de-identified] :  carotid bruit [de-identified] :   Systolic ejection murmur 2/6 [de-identified] : No bruit

## 2024-06-04 NOTE — CARDIOLOGY SUMMARY
[de-identified] : December 29, 2021.  Normal sinus rhythm 5/17/2023 normal sinus rhythm poor R wave progression nonspecific ST-T change May 7, 2024.  Normal sinus rhythm poor R wave progression [de-identified] : 1/19/22 EF 60-65%, mild MR, mild AR Echo November 12, 2020 EF 65% mild mitral and aortic regurgitation mild tricuspid regurgitation PASP 44 mmHg   Echocardiogram.  November 7, 2023.  LVEF 60 to 65%.  Dilated left atrium.  Mild MR AR mild to moderate aortic stenosis mild TR.  Pulmonary pressures normal.  [de-identified] : Carotid US 1/19/2022 non-obstructive disease Bilateral carotid Doppler study. November 7, 2023.  Significant tortuosity.  Left ICA 50 to 69%. Abd US 3/21/2016 no evidence of AAA [de-identified] : Renal artery Doppler study January 2023.  No significant evidence of renal artery stenosis.  Kidney is somewhat smaller in size without distinct cortical thinning.  Labs from May 6, 2024 sodium 143 potassium 4.9 creatinine 1.35 LFT normal.   Labs 5/30/2024 N-terminal proBNP 1184  Labs 5/29/2014 BNP [___] : [unfilled] [LVEF ___%] : LVEF [unfilled]% [Normal] : normal LA size [Mild] : mild mitral regurgitation

## 2024-06-04 NOTE — DISCUSSION/SUMMARY
[FreeTextEntry1] : LAURA BENZ  is a 79 year F  who presents today  with the above history and the following active issues.   Lower extremity swelling.  Significant sulfa allergies.  Chlorthalidone recently changed to Ethacrynic acid 25mg QD. Bumex was not covered by insurance as per last note.  Add Spironolactone 50mg QD and hold potassium supplement.  She will continue with low-salt diet and compression stockings.   Also follow recommendation by vascular surgery for venous insufficiency in the form of compression stockings. Follow up labs in 1 week including BMP and BNP  HTN  Controlled on my assessment Continue Carvedilol, Irbesartan and Hydralazine Low sodium diet As per last note if needed to decrease hydralazine change carvedilol to labetalol. Goal less than 130/80.  Lifestyle and risk factor modification No evidence of renal artery stenosis.  Moderate left ICA disease.  Tortuosity may increase velocity to give higher stenosis on a Doppler study.  Asymptomatic otherwise.  Continue with aspirin and statin therapy.  Follow serially.  Dyslipidemia.  A continue statin therapy.  Lifestyle modifications.  Low saturated fat carbohydrate intake  Graves' disease. F/U PCP.   Nonrheumatic aortic insufficiency/AS, pulmonary hypertension.   Red flag symptoms which would warrant sooner emergent evaluation reviewed with the patient.  Questions and concerns were addressed and answered.   Sincerely,  Skylar Ledezma PA-C Patients history, testing and plan reviewed with supervising MD: Dr. Wasserman Discussed over the phone with Dr. Everett

## 2024-06-04 NOTE — HISTORY OF PRESENT ILLNESS
[FreeTextEntry1] : LAURA BENZ  is a 79 year F  who presents today with persistent lower extremity edema.   Swelling improves after elevating the legs and in the morning.  Suffering from right hip pain and walking with a walker. Today she denies chest pain, pressure, unusual shortness of breath, lightheadedness, dizziness, near syncope or syncope.     Her medical history mainly significant for Graves' disease with recent changes in medications.  Ophthalmopathy Essential hypertension without any history of congestive heart failure, renal insufficiency. She is a nonsmoker. Hyperlipidemia. On statin therapy. Rheumatoid arthritis with gray nodes syndrome. Gastroesophageal reflux disease. Osteoarthritis.  Status post back surgery.  She has no prior CHF, MI, syncope

## 2024-06-09 ENCOUNTER — RX RENEWAL (OUTPATIENT)
Age: 79
End: 2024-06-09

## 2024-06-10 RX ORDER — IRBESARTAN 300 MG/1
300 TABLET ORAL
Qty: 90 | Refills: 0 | Status: ACTIVE | COMMUNITY
Start: 2022-09-21 | End: 1900-01-01

## 2024-07-05 ENCOUNTER — APPOINTMENT (OUTPATIENT)
Dept: CARDIOLOGY | Facility: CLINIC | Age: 79
End: 2024-07-05
Payer: MEDICARE

## 2024-07-05 VITALS — HEIGHT: 64 IN | WEIGHT: 147 LBS | OXYGEN SATURATION: 96 % | HEART RATE: 52 BPM | BODY MASS INDEX: 25.1 KG/M2

## 2024-07-05 VITALS — DIASTOLIC BLOOD PRESSURE: 78 MMHG | SYSTOLIC BLOOD PRESSURE: 146 MMHG

## 2024-07-05 DIAGNOSIS — I13.10 HYPERTENSIVE HEART AND CHRONIC KIDNEY DISEASE W/OUT HEART FAILURE, WITH STAGE 1 THROUGH STAGE 4 CHRONIC KIDNEY DISEASE, OR UNSPECIFIED CHRONIC KIDNEY DISEASE: ICD-10-CM

## 2024-07-05 DIAGNOSIS — R60.0 LOCALIZED EDEMA: ICD-10-CM

## 2024-07-05 DIAGNOSIS — E78.5 HYPERLIPIDEMIA, UNSPECIFIED: ICD-10-CM

## 2024-07-05 DIAGNOSIS — I27.20 PULMONARY HYPERTENSION, UNSPECIFIED: ICD-10-CM

## 2024-07-05 PROCEDURE — G2211 COMPLEX E/M VISIT ADD ON: CPT

## 2024-07-05 PROCEDURE — 99214 OFFICE O/P EST MOD 30 MIN: CPT

## 2024-07-05 RX ORDER — UBIDECARENONE/VIT E ACET 100MG-5
CAPSULE ORAL
Refills: 0 | Status: ACTIVE | COMMUNITY

## 2024-09-10 ENCOUNTER — RX RENEWAL (OUTPATIENT)
Age: 79
End: 2024-09-10

## 2024-09-30 ENCOUNTER — RX RENEWAL (OUTPATIENT)
Age: 79
End: 2024-09-30

## 2024-11-05 ENCOUNTER — APPOINTMENT (OUTPATIENT)
Dept: CARDIOLOGY | Facility: CLINIC | Age: 79
End: 2024-11-05
Payer: MEDICARE

## 2024-11-05 ENCOUNTER — NON-APPOINTMENT (OUTPATIENT)
Age: 79
End: 2024-11-05

## 2024-11-05 VITALS
HEART RATE: 62 BPM | OXYGEN SATURATION: 95 % | BODY MASS INDEX: 24.75 KG/M2 | SYSTOLIC BLOOD PRESSURE: 122 MMHG | DIASTOLIC BLOOD PRESSURE: 66 MMHG | WEIGHT: 145 LBS | HEIGHT: 64 IN

## 2024-11-05 DIAGNOSIS — I08.0 RHEUMATIC DISORDERS OF BOTH MITRAL AND AORTIC VALVES: ICD-10-CM

## 2024-11-05 DIAGNOSIS — E78.5 HYPERLIPIDEMIA, UNSPECIFIED: ICD-10-CM

## 2024-11-05 DIAGNOSIS — I65.23 OCCLUSION AND STENOSIS OF BILATERAL CAROTID ARTERIES: ICD-10-CM

## 2024-11-05 DIAGNOSIS — I13.10 HYPERTENSIVE HEART AND CHRONIC KIDNEY DISEASE W/OUT HEART FAILURE, WITH STAGE 1 THROUGH STAGE 4 CHRONIC KIDNEY DISEASE, OR UNSPECIFIED CHRONIC KIDNEY DISEASE: ICD-10-CM

## 2024-11-05 DIAGNOSIS — R60.0 LOCALIZED EDEMA: ICD-10-CM

## 2024-11-05 PROCEDURE — 93306 TTE W/DOPPLER COMPLETE: CPT

## 2024-11-05 PROCEDURE — 76376 3D RENDER W/INTRP POSTPROCES: CPT

## 2024-11-05 PROCEDURE — 99214 OFFICE O/P EST MOD 30 MIN: CPT

## 2024-11-05 PROCEDURE — 93880 EXTRACRANIAL BILAT STUDY: CPT

## 2024-11-05 PROCEDURE — G2211 COMPLEX E/M VISIT ADD ON: CPT

## 2024-12-27 ENCOUNTER — OFFICE (OUTPATIENT)
Dept: URBAN - METROPOLITAN AREA CLINIC 8 | Facility: CLINIC | Age: 79
Setting detail: OPHTHALMOLOGY
End: 2024-12-27
Payer: MEDICARE

## 2024-12-27 DIAGNOSIS — H26.492: ICD-10-CM

## 2024-12-27 DIAGNOSIS — E05.00: ICD-10-CM

## 2024-12-27 DIAGNOSIS — H16.223: ICD-10-CM

## 2024-12-27 DIAGNOSIS — H02.834: ICD-10-CM

## 2024-12-27 DIAGNOSIS — Z96.1: ICD-10-CM

## 2024-12-27 DIAGNOSIS — H02.831: ICD-10-CM

## 2024-12-27 DIAGNOSIS — H02.403: ICD-10-CM

## 2024-12-27 DIAGNOSIS — H02.015: ICD-10-CM

## 2024-12-27 PROCEDURE — 92083 EXTENDED VISUAL FIELD XM: CPT

## 2024-12-27 PROCEDURE — 99213 OFFICE O/P EST LOW 20 MIN: CPT

## 2024-12-27 ASSESSMENT — REFRACTION_MANIFEST
OD_ADD: +2.50
OS_SPHERE: -0.25
OS_VA2: 20/20(J1+)
OS_VA1: 20/25-
OD_AXIS: 175
OD_VA1: 20/25-2
OS_ADD: +2.50
OS_AXIS: 170
OU_VA: 20/25+3
OD_SPHERE: PLANO
OS_CYLINDER: -0.50
OD_VA2: 20/20(J1+)
OD_CYLINDER: -0.50

## 2024-12-27 ASSESSMENT — SUPERFICIAL PUNCTATE KERATITIS (SPK)
OD_SPK: T
OS_SPK: T

## 2024-12-27 ASSESSMENT — REFRACTION_AUTOREFRACTION
OD_SPHERE: -0.25
OS_AXIS: 162
OD_CYLINDER: -4.25
OS_SPHERE: +0.25
OS_CYLINDER: -0.75
OD_AXIS: 007

## 2024-12-27 ASSESSMENT — LID POSITION - PTOSIS
OS_PTOSIS: 1+
OD_PTOSIS: 1+

## 2024-12-27 ASSESSMENT — VISUAL ACUITY
OD_BCVA: 20/40
OS_BCVA: 20/40

## 2024-12-27 ASSESSMENT — KERATOMETRY
OD_AXISANGLE_DEGREES: 092
OD_K2POWER_DIOPTERS: 44.25
OD_K1POWER_DIOPTERS: 43.00
METHOD_AUTO_MANUAL: AUTO

## 2024-12-27 ASSESSMENT — LID POSITION - DERMATOCHALASIS
OD_DERMATOCHALASIS: 2+
OS_DERMATOCHALASIS: 2+

## 2024-12-27 ASSESSMENT — REFRACTION_CURRENTRX
OD_ADD: +2.50
OD_VPRISM_DIRECTION: SV
OS_ADD: +2.50
OS_OVR_VA: 20/
OS_VPRISM_DIRECTION: SV
OD_OVR_VA: 20/

## 2024-12-27 ASSESSMENT — TONOMETRY
OS_IOP_MMHG: 15
OD_IOP_MMHG: 15

## 2024-12-27 ASSESSMENT — LID EXAM ASSESSMENTS: OS_TRICHIASIS: ABSENT

## 2024-12-27 ASSESSMENT — CONFRONTATIONAL VISUAL FIELD TEST (CVF)
OD_FINDINGS: FULL
OS_FINDINGS: FULL

## 2025-02-25 ENCOUNTER — NON-APPOINTMENT (OUTPATIENT)
Age: 80
End: 2025-02-25

## 2025-02-25 ENCOUNTER — APPOINTMENT (OUTPATIENT)
Dept: CARDIOLOGY | Facility: CLINIC | Age: 80
End: 2025-02-25
Payer: MEDICARE

## 2025-02-25 VITALS
BODY MASS INDEX: 24.07 KG/M2 | OXYGEN SATURATION: 95 % | DIASTOLIC BLOOD PRESSURE: 62 MMHG | WEIGHT: 141 LBS | HEIGHT: 64 IN | SYSTOLIC BLOOD PRESSURE: 96 MMHG | HEART RATE: 57 BPM

## 2025-02-25 DIAGNOSIS — I13.10 HYPERTENSIVE HEART AND CHRONIC KIDNEY DISEASE W/OUT HEART FAILURE, WITH STAGE 1 THROUGH STAGE 4 CHRONIC KIDNEY DISEASE, OR UNSPECIFIED CHRONIC KIDNEY DISEASE: ICD-10-CM

## 2025-02-25 DIAGNOSIS — I35.2 NONRHEUMATIC AORTIC (VALVE) STENOSIS WITH INSUFFICIENCY: ICD-10-CM

## 2025-02-25 DIAGNOSIS — E78.5 HYPERLIPIDEMIA, UNSPECIFIED: ICD-10-CM

## 2025-02-25 DIAGNOSIS — R60.0 LOCALIZED EDEMA: ICD-10-CM

## 2025-02-25 PROCEDURE — G2211 COMPLEX E/M VISIT ADD ON: CPT

## 2025-02-25 PROCEDURE — 93000 ELECTROCARDIOGRAM COMPLETE: CPT

## 2025-02-25 PROCEDURE — 99214 OFFICE O/P EST MOD 30 MIN: CPT

## 2025-03-11 ENCOUNTER — RX RENEWAL (OUTPATIENT)
Age: 80
End: 2025-03-11

## 2025-03-29 ENCOUNTER — RX RENEWAL (OUTPATIENT)
Age: 80
End: 2025-03-29

## 2025-04-22 NOTE — ASSESSMENT
Please check prior auth.    Date:5/15/2025    Doc:Khunnawat    Procedure:  Dual PGR - SJM    CPT: 51049    ICD: Z45.02    Electronically signed by Teressa Bhatt MA on 4/22/2025 at 8:34 AM     [FreeTextEntry1] : Reviewed today:  EKG, NSR. \par \par Reviewed on January 31, 2019\par Labs, EKG, chest x-ray, CT of the chest from [facility] were reviewed.\par \par Reviewed on April 3, 2019\par Echocardiogram. . Reviewed by me. Ejection fraction 65% mild aortic insufficiency. Mild mitral regurgitation. No pericardial effusion. Pulmonary pressure around 40 mm mercury suggesting mild pulmonary hypertension.\par Chest x-ray are obtained by her recently for her pulmonary symptoms had shown persistent small left pleural effusion, otherwise no significant cardiovascular abnormality.\par CT of the chest in January 2019 at [facility] had shown also small left pleural effusion. No pericardial effusion.\par \par Reviewed on  August 5, 2019.\par Labs from May 3, 2019 weren't reviewed.

## 2025-04-29 ENCOUNTER — RX RENEWAL (OUTPATIENT)
Age: 80
End: 2025-04-29

## 2025-06-03 ENCOUNTER — RX RENEWAL (OUTPATIENT)
Age: 80
End: 2025-06-03

## 2025-06-16 ENCOUNTER — RX RENEWAL (OUTPATIENT)
Age: 80
End: 2025-06-16

## 2025-07-22 ENCOUNTER — OFFICE (OUTPATIENT)
Dept: URBAN - METROPOLITAN AREA CLINIC 8 | Facility: CLINIC | Age: 80
Setting detail: OPHTHALMOLOGY
End: 2025-07-22
Payer: MEDICARE

## 2025-07-22 DIAGNOSIS — H02.831: ICD-10-CM

## 2025-07-22 DIAGNOSIS — H02.834: ICD-10-CM

## 2025-07-22 DIAGNOSIS — H16.223: ICD-10-CM

## 2025-07-22 DIAGNOSIS — H02.403: ICD-10-CM

## 2025-07-22 PROCEDURE — 99213 OFFICE O/P EST LOW 20 MIN: CPT

## 2025-07-22 ASSESSMENT — LID EXAM ASSESSMENTS: OS_TRICHIASIS: ABSENT

## 2025-07-22 ASSESSMENT — LID POSITION - PTOSIS
OS_PTOSIS: 1+
OD_PTOSIS: 1+

## 2025-07-22 ASSESSMENT — LID POSITION - DERMATOCHALASIS
OS_DERMATOCHALASIS: 2+
OD_DERMATOCHALASIS: 2+

## 2025-07-22 ASSESSMENT — CONFRONTATIONAL VISUAL FIELD TEST (CVF)
OS_FINDINGS: FULL
OD_FINDINGS: FULL

## 2025-07-22 ASSESSMENT — SUPERFICIAL PUNCTATE KERATITIS (SPK)
OD_SPK: T
OS_SPK: T

## 2025-07-24 ASSESSMENT — KERATOMETRY
OS_K2POWER_DIOPTERS: 44.00
METHOD_AUTO_MANUAL: AUTO
OD_K1POWER_DIOPTERS: UNABLE
OS_K1POWER_DIOPTERS: 42.50
OS_AXISANGLE_DEGREES: 082

## 2025-07-24 ASSESSMENT — REFRACTION_CURRENTRX
OD_VPRISM_DIRECTION: SV
OS_ADD: +2.75
OS_OVR_VA: 20/
OD_ADD: +2.75
OD_OVR_VA: 20/
OS_VPRISM_DIRECTION: SV

## 2025-07-24 ASSESSMENT — REFRACTION_MANIFEST
OS_AXIS: 170
OD_ADD: +2.50
OD_CYLINDER: -0.50
OD_VA1: 20/25-2
OS_VA2: 20/20(J1+)
OU_VA: 20/25+3
OS_VA1: 20/25-
OD_VA2: 20/20(J1+)
OS_CYLINDER: -0.50
OS_ADD: +2.50
OD_SPHERE: PLANO
OS_SPHERE: -0.25
OD_AXIS: 175

## 2025-07-24 ASSESSMENT — REFRACTION_AUTOREFRACTION
OS_SPHERE: PLANO
OS_CYLINDER: -1.00
OS_AXIS: 151
OD_SPHERE: PLANO
OD_CYLINDER: -2.75
OD_AXIS: 003

## 2025-07-24 ASSESSMENT — VISUAL ACUITY
OD_BCVA: 20/40
OS_BCVA: 20/40

## 2025-08-12 ENCOUNTER — RX ONLY (RX ONLY)
Age: 80
End: 2025-08-12

## 2025-08-12 ENCOUNTER — OFFICE (OUTPATIENT)
Dept: URBAN - METROPOLITAN AREA CLINIC 38 | Facility: CLINIC | Age: 80
Setting detail: OPHTHALMOLOGY
End: 2025-08-12
Payer: MEDICARE

## 2025-08-12 DIAGNOSIS — H02.423: ICD-10-CM

## 2025-08-12 DIAGNOSIS — E05.00: ICD-10-CM

## 2025-08-12 PROCEDURE — 99213 OFFICE O/P EST LOW 20 MIN: CPT | Performed by: STUDENT IN AN ORGANIZED HEALTH CARE EDUCATION/TRAINING PROGRAM

## 2025-08-12 ASSESSMENT — LID EXAM ASSESSMENTS
OD_LEVATOR_FUNCTION: 14 MM
OS_LAXITY: 3+
OD_COMMENTS: GOOD BELLS RESPONSE UL COMMENTS
OD_LAXITY: 3+
OD_COMMENTS: GOOD SNAPBACK LL COMMENTS
OS_LEVATOR_FUNCTION: 14 MM
OS_COMMENTS: GOOD BELLS RESPONSE UL COMMENTS
OD_MRD1: 0 MM
OS_MRD1: 0 MM
OS_COMMENTS: GOOD SNAPBACK LL COMMENT
OS_TRICHIASIS: ABSENT
OS_COMMENTS: GOOD SNAPBACK LL COMMENTS
OS_MRD2: 10MM
OD_MRD2: 10MM
OD_COMMENTS: GOOD SNAPBACK LL COMMENT

## 2025-08-12 ASSESSMENT — KERATOMETRY
OD_K1POWER_DIOPTERS: UNABLE
OS_AXISANGLE_DEGREES: 082
METHOD_AUTO_MANUAL: AUTO
OS_K2POWER_DIOPTERS: 44.00
OS_K1POWER_DIOPTERS: 42.50

## 2025-08-12 ASSESSMENT — LID POSITION - PTOSIS
OS_PTOSIS: 1+
OD_PTOSIS: 1+

## 2025-08-12 ASSESSMENT — SUPERFICIAL PUNCTATE KERATITIS (SPK)
OS_SPK: T
OD_SPK: T

## 2025-08-12 ASSESSMENT — REFRACTION_AUTOREFRACTION
OD_CYLINDER: -2.75
OS_AXIS: 151
OS_SPHERE: PLANO
OD_SPHERE: PLANO
OS_CYLINDER: -1.00
OD_AXIS: 003

## 2025-08-12 ASSESSMENT — CONFRONTATIONAL VISUAL FIELD TEST (CVF)
OD_FINDINGS: FULL
OS_FINDINGS: FULL

## 2025-08-12 ASSESSMENT — LID POSITION - DERMATOCHALASIS
OD_DERMATOCHALASIS: 2+
OS_DERMATOCHALASIS: 2+

## 2025-08-12 ASSESSMENT — VISUAL ACUITY
OD_BCVA: 20/40
OS_BCVA: 20/40

## 2025-08-13 PROBLEM — E05.00 GRAVES/THYROID OPH NO CRISIS: Status: ACTIVE | Noted: 2025-08-12

## 2025-08-18 ENCOUNTER — RX RENEWAL (OUTPATIENT)
Age: 80
End: 2025-08-18

## 2025-09-10 ENCOUNTER — NON-APPOINTMENT (OUTPATIENT)
Age: 80
End: 2025-09-10

## 2025-09-10 ENCOUNTER — APPOINTMENT (OUTPATIENT)
Dept: CARDIOLOGY | Facility: CLINIC | Age: 80
End: 2025-09-10
Payer: MEDICARE

## 2025-09-10 VITALS
OXYGEN SATURATION: 95 % | WEIGHT: 140 LBS | HEART RATE: 55 BPM | HEIGHT: 64 IN | DIASTOLIC BLOOD PRESSURE: 70 MMHG | SYSTOLIC BLOOD PRESSURE: 118 MMHG | BODY MASS INDEX: 23.9 KG/M2

## 2025-09-10 DIAGNOSIS — E87.5 HYPERKALEMIA: ICD-10-CM

## 2025-09-10 DIAGNOSIS — I13.10 HYPERTENSIVE HEART AND CHRONIC KIDNEY DISEASE W/OUT HEART FAILURE, WITH STAGE 1 THROUGH STAGE 4 CHRONIC KIDNEY DISEASE, OR UNSPECIFIED CHRONIC KIDNEY DISEASE: ICD-10-CM

## 2025-09-10 DIAGNOSIS — R60.0 LOCALIZED EDEMA: ICD-10-CM

## 2025-09-10 DIAGNOSIS — E78.5 HYPERLIPIDEMIA, UNSPECIFIED: ICD-10-CM

## 2025-09-10 DIAGNOSIS — I35.2 NONRHEUMATIC AORTIC (VALVE) STENOSIS WITH INSUFFICIENCY: ICD-10-CM

## 2025-09-10 DIAGNOSIS — I27.20 PULMONARY HYPERTENSION, UNSPECIFIED: ICD-10-CM

## 2025-09-10 PROCEDURE — 99214 OFFICE O/P EST MOD 30 MIN: CPT

## 2025-09-10 PROCEDURE — G2211 COMPLEX E/M VISIT ADD ON: CPT
